# Patient Record
Sex: FEMALE | Race: WHITE | Employment: FULL TIME | URBAN - METROPOLITAN AREA
[De-identification: names, ages, dates, MRNs, and addresses within clinical notes are randomized per-mention and may not be internally consistent; named-entity substitution may affect disease eponyms.]

---

## 2017-01-23 DIAGNOSIS — J45.40 MODERATE PERSISTENT ASTHMA, UNCOMPLICATED: ICD-10-CM

## 2017-01-23 RX ORDER — MONTELUKAST SODIUM 10 MG/1
10 TABLET ORAL DAILY
Qty: 90 TAB | Refills: 2 | Status: SHIPPED | OUTPATIENT
Start: 2017-01-23 | End: 2019-03-20 | Stop reason: SDUPTHER

## 2017-01-23 NOTE — TELEPHONE ENCOUNTER
From: Nadene Ganser  To: Boaz Gomez MD  Sent: 1/23/2017 10:27 AM EST  Subject: Medication Renewal Request    Original authorizing provider: Grant Racer, MD Nadene Ganser would like a refill of the following medications:  montelukast (SINGULAIR) 10 mg tablet Boaz Gomez MD]    Preferred pharmacy: [facility] [address], [address]:

## 2017-03-28 ENCOUNTER — OFFICE VISIT (OUTPATIENT)
Dept: FAMILY MEDICINE CLINIC | Age: 22
End: 2017-03-28

## 2017-03-28 VITALS
RESPIRATION RATE: 18 BRPM | WEIGHT: 137.8 LBS | DIASTOLIC BLOOD PRESSURE: 78 MMHG | TEMPERATURE: 97.9 F | HEIGHT: 62 IN | SYSTOLIC BLOOD PRESSURE: 124 MMHG | HEART RATE: 98 BPM | OXYGEN SATURATION: 99 % | BODY MASS INDEX: 25.36 KG/M2

## 2017-03-28 DIAGNOSIS — R40.0 DAYTIME SOMNOLENCE: Primary | ICD-10-CM

## 2017-03-28 RX ORDER — CETIRIZINE HCL 10 MG
TABLET ORAL
COMMUNITY
End: 2019-04-02 | Stop reason: ALTCHOICE

## 2017-03-28 NOTE — MR AVS SNAPSHOT
Visit Information Date & Time Provider Department Dept. Phone Encounter #  
 3/28/2017  3:00 PM Marian Arcadia, [employer] [phone] [id number] Upcoming Health Maintenance Date Due  
 HPV AGE 9Y-34Y (1 of 3 - Female 3 Dose Series) 9/15/2005 Pneumococcal 19-64 Medium Risk (1 of 1 - PPSV23) 9/15/2013 PAP AKA CERVICAL CYTOLOGY 9/15/2015 DTaP/Tdap/Td series (2 - Td) 6/17/2026 Allergies as of 3/28/2017  Review Complete On: 3/28/2017 By: Latrice Musa LPN Severity Noted Reaction Type Reactions Latex High 11/02/2015    Anaphylaxis, Rash Banana High 08/11/2014    Anaphylaxis Shellfish Derived High 08/11/2014    Anaphylaxis Current Immunizations  Never Reviewed Name Date Influenza Vaccine 10/4/2014 Tdap 6/17/2016 Not reviewed this visit You Were Diagnosed With   
  
 Codes Comments Daytime somnolence    -  Primary ICD-10-CM: R40.0 ICD-9-CM: 780.54 Vitals BP Pulse Temp Resp Height(growth percentile) Weight(growth percentile) 124/78 98 97.9 °F (36.6 °C) 18 5' 2\" (1.575 m) 137 lb 12.8 oz (62.5 kg) LMP SpO2 BMI OB Status Smoking Status 03/12/2017 99% 25.2 kg/m2 Having regular periods Never Smoker Vitals History BMI and BSA Data Body Mass Index Body Surface Area  
 25.2 kg/m 2 1.65 m 2 Preferred Pharmacy Pharmacy Name Phone [facility] - [address], [address] [phone] Your Updated Medication List  
  
   
This list is accurate as of: 3/28/17  3:14 PM.  Always use your most recent med list.  
  
  
  
  
 * albuterol sulfate 2.5 mg/0.5 mL Nebu nebulizer solution Commonly known as:  PROVENTIL;VENTOLIN  
0.5 mL by Nebulization route every four (4) hours as needed for Wheezing. * albuterol 90 mcg/actuation inhaler Commonly known as:  PROVENTIL HFA, VENTOLIN HFA, PROAIR HFA  
 Take 2 Puffs by inhalation every four (4) hours as needed for Wheezing or Shortness of Breath. cetirizine 10 mg tablet Commonly known as:  ZYRTEC Take  by mouth. DULERA 200-5 mcg/actuation HFA inhaler Generic drug:  mometasone-formoterol INHALE 2 PUFFS BY MOUTH 2 TIMES A DAY  
  
 EPINEPHrine 0.15 mg/0.3 mL injection Commonly known as:  EPIPEN JR  
0.15 mg by IntraMUSCular route once as needed. LOESTRIN FE 1/20 (28-DAY) 1 mg-20 mcg (21)/75 mg (7) Tab Generic drug:  norethindrone-ethinyl estradiol Take  by mouth.  
  
 montelukast 10 mg tablet Commonly known as:  SINGULAIR Take 1 Tab by mouth daily. Nebulizer & Compressor machine 1 Each by Does Not Apply route every four (4) hours as needed. Nebulizer Accessories Kit Hosing/tubing * Notice: This list has 2 medication(s) that are the same as other medications prescribed for you. Read the directions carefully, and ask your doctor or other care provider to review them with you. We Performed the Following SLEEP MEDICINE REFERRAL [LRK241 Custom] Comments:  
 Please evaluate patient for daytime somnolence. Gaye Lacey Referral Information Referral ID Referred By Referred To  
  
 [id number] Anita Edouard V Not Available Visits Status Start Date End Date 1 New Request 3/28/17 3/28/18 If your referral has a status of pending review or denied, additional information will be sent to support the outcome of this decision. Patient Instructions Sleep Studies: About This Test 
What is it? Sleep studies are tests that watch what happens to your body during sleep. These studies usually are done in a sleep lab. Sleep labs are often located in hospitals. But sleep studies also can be done with portable equipment that you use at home. Why is this test done? Sleep studies are done to find sleep problems, including: · Sleep apnea or excessive snoring. · Narcolepsy. · Nocturnal seizures. · REM behavior disorder (RBD). · Repeated muscle twitching of the feet, arms, or legs while you sleep. How can you prepare for the test? 
· You may be asked to keep a sleep diary for 1 to 2 weeks before your sleep study. · Don't take any naps for 2 to 3 days before your test. 
· You may be asked to avoid food or drinks with caffeine for a day or two before your test. 
· Take a shower or bath before your test, but don't use sprays, oils, or gels on your hair. Don't wear makeup, fingernail polish, or fake nails. · Pack and take along a small overnight bag with personal items, such as a toothbrush, a comb, favorite pillows or blankets, and a book. You can wear your own nightclothes. What happens during the test? 
· In the sleep lab, you will be in a private room, much like a hotel room. · Small pads or patches called electrodes will be placed on your head and body with a small amount of glue and tape. These will record things like brain activity, eye movement, oxygen levels, and snoring. · Soft elastic belts will be placed around your chest and belly to measure your breathing. · Your blood oxygen levels will be checked by a small clip (oximeter) placed either on the tip of your index finger or on your earlobe. · If you have sleep apnea, you may wear a mask that is connected to a continuous positive airway pressure (CPAP) machine. · Depending on the type of test, you will be allowed to sleep through the night or you will be awakened periodically and asked to stay awake for a while. What else should you know about the test? 
· It may feel odd to be hooked to the sleep study equipment. The sleep lab technician understands that your sleep may not be the same as it is at home because of the equipment. Try to relax and make yourself as comfortable as possible. · After your sleep problem has been identified, you may need a second study if your doctor orders treatment such as CPAP. · Portable sleep study equipment is available for a person to do sleep studies at home. This may be a choice for people who have problems sleeping in a sleep lab. But home sleep studies may not give the same results as a sleep lab. How long does the test take? · You will stay in the sleep lab overnight. For some tests, you will also stay part of the next day. What happens after the test? 
· You will be able to go home right away. · You can go back to your usual activities right away. Follow-up care is a key part of your treatment and safety. Be sure to make and go to all appointments, and call your doctor if you are having problems. It's also a good idea to keep a list of the medicines you take. Ask your doctor when you can expect to have your test results. Where can you learn more? Go to http://ivan-grant.info/. Enter Z481 in the search box to learn more about \"Sleep Studies: About This Test.\" Current as of: August 8, 2016 Content Version: 11.1 © 4281-7692 [employer] [email or web address]. Care instructions adapted under license by [employer] (which disclaims liability or warranty for this information). If you have questions about a medical condition or this instruction, always ask your healthcare professional. [address] any warranty or liability for your use of this information. Introducing [address]! Dear Henry Salmeron: Thank you for requesting a Face.com account. Our records indicate that you already have an active Face.com account. You can access your account anytime at [email or web address] Did you know that you can access your hospital and ER discharge instructions at any time in Face.com? You can also review all of your test results from your hospital stay or ER visit. Additional Information If you have questions, please visit the Frequently Asked Questions section of the [employer] website at [email or web address] Remember, [employer] is NOT to be used for urgent needs. For medical emergencies, dial 911. Now available from your iPhone and Android! Please provide this summary of care documentation to your next provider. Your primary care clinician is listed as Mechelle Adams. If you have any questions after today's visit, please call [phone].

## 2017-03-28 NOTE — PATIENT INSTRUCTIONS
Sleep Studies: About This Test  What is it? Sleep studies are tests that watch what happens to your body during sleep. These studies usually are done in a sleep lab. Sleep labs are often located in hospitals. But sleep studies also can be done with portable equipment that you use at home. Why is this test done? Sleep studies are done to find sleep problems, including:  · Sleep apnea or excessive snoring. · Narcolepsy. · Nocturnal seizures. · REM behavior disorder (RBD). · Repeated muscle twitching of the feet, arms, or legs while you sleep. How can you prepare for the test?  · You may be asked to keep a sleep diary for 1 to 2 weeks before your sleep study. · Don't take any naps for 2 to 3 days before your test.  · You may be asked to avoid food or drinks with caffeine for a day or two before your test.  · Take a shower or bath before your test, but don't use sprays, oils, or gels on your hair. Don't wear makeup, fingernail polish, or fake nails. · Pack and take along a small overnight bag with personal items, such as a toothbrush, a comb, favorite pillows or blankets, and a book. You can wear your own nightclothes. What happens during the test?  · In the sleep lab, you will be in a private room, much like a hotel room. · Small pads or patches called electrodes will be placed on your head and body with a small amount of glue and tape. These will record things like brain activity, eye movement, oxygen levels, and snoring. · Soft elastic belts will be placed around your chest and belly to measure your breathing. · Your blood oxygen levels will be checked by a small clip (oximeter) placed either on the tip of your index finger or on your earlobe. · If you have sleep apnea, you may wear a mask that is connected to a continuous positive airway pressure (CPAP) machine.   · Depending on the type of test, you will be allowed to sleep through the night or you will be awakened periodically and asked to stay awake for a while. What else should you know about the test?  · It may feel odd to be hooked to the sleep study equipment. The sleep lab technician understands that your sleep may not be the same as it is at home because of the equipment. Try to relax and make yourself as comfortable as possible. · After your sleep problem has been identified, you may need a second study if your doctor orders treatment such as CPAP. · Portable sleep study equipment is available for a person to do sleep studies at home. This may be a choice for people who have problems sleeping in a sleep lab. But home sleep studies may not give the same results as a sleep lab. How long does the test take? · You will stay in the sleep lab overnight. For some tests, you will also stay part of the next day. What happens after the test?  · You will be able to go home right away. · You can go back to your usual activities right away. Follow-up care is a key part of your treatment and safety. Be sure to make and go to all appointments, and call your doctor if you are having problems. It's also a good idea to keep a list of the medicines you take. Ask your doctor when you can expect to have your test results. Where can you learn more? Go to http://ivan-grant.info/. Enter O319 in the search box to learn more about \"Sleep Studies: About This Test.\"  Current as of: August 8, 2016  Content Version: 11.1  © 8460-0472 Healthwise, Incorporated. Care instructions adapted under license by [employer] (which disclaims liability or warranty for this information). If you have questions about a medical condition or this instruction, always ask your healthcare professional. [address] any warranty or liability for your use of this information.

## 2017-03-28 NOTE — PROGRESS NOTES
Assessment/Plan:    1. Daytime somnolence  -hypersomnia vs sleep apnea. Sleep study.  - SLEEP MEDICINE REFERRAL    The plan was discussed with the patient. The patient verbalized understanding and is in agreement with the plan. All medication potential side effects were discussed with the patient. Health Maintenance:   Health Maintenance   Topic Date Due    HPV AGE 9Y-34Y (1 of 3 - Female 3 Dose Series) 09/15/2005    Pneumococcal 19-64 Medium Risk (1 of 1 - PPSV23) 09/15/2013    PAP AKA CERVICAL CYTOLOGY  09/15/2015    DTaP/Tdap/Td series (2 - Td) 06/17/2026    INFLUENZA AGE 9 TO ADULT  Completed       Leonel Lamas is a 24 y.o. female and presents with Fatigue     Subjective:  States she has significant fatigue and daytime somnolence. States she's actually fallen asleep while driving. Gets 7-8 hours sleep/night. This has been ongoing x several months. Doesn't wake up feeling refreshed. No nocturia. No morning HA. +snoring. ROS:  Constitutional: No recent weight change. No weakness/fatigue. No f/c. Cardiovascular: No CP/palpitations. No PIZANO/orthopnea/PND. Respiratory: No cough/sputum, dyspnea, wheezing. Gastointestinal: No dysphagia, reflux. No n/v. No constipation/diarrhea. No melena/rectal bleeding. Neurological: No seizures/numbness/weakness. No paresthesias. The problem list was updated as a part of today's visit. Patient Active Problem List   Diagnosis Code    Moderate persistent asthma J45.40    Allergic rhinitis J30.9       The PSH, FH were reviewed. SH:  Social History   Substance Use Topics    Smoking status: Never Smoker    Smokeless tobacco: Never Used    Alcohol use No       Medications/Allergies:  Current Outpatient Prescriptions on File Prior to Visit   Medication Sig Dispense Refill    DULERA 200-5 mcg/actuation HFA inhaler INHALE 2 PUFFS BY MOUTH 2 TIMES A DAY 13 Inhaler 6    montelukast (SINGULAIR) 10 mg tablet Take 1 Tab by mouth daily. 90 Tab 2    Nebulizer & Compressor machine 1 Each by Does Not Apply route every four (4) hours as needed. 1 Each 0    Nebulizer Accessories kit Hosing/tubing 1 Kit 0    albuterol (PROVENTIL HFA, VENTOLIN HFA, PROAIR HFA) 90 mcg/actuation inhaler Take 2 Puffs by inhalation every four (4) hours as needed for Wheezing or Shortness of Breath. 1 Inhaler 6    albuterol sulfate (PROVENTIL;VENTOLIN) 2.5 mg/0.5 mL nebu nebulizer solution 0.5 mL by Nebulization route every four (4) hours as needed for Wheezing. 20 mL 6    EPINEPHrine (EPIPEN JR) 0.15 mg/0.3 mL (1:2,000) injection 0.15 mg by IntraMUSCular route once as needed.  norethindrone-ethinyl estradiol (LOESTRIN FE 1/20, 28,) 1-20 mg-mcg per tablet Take  by mouth. No current facility-administered medications on file prior to visit. Allergies   Allergen Reactions    Latex Anaphylaxis and Rash    Banana Anaphylaxis    Shellfish Derived Anaphylaxis       Objective:  Visit Vitals    /78    Pulse 98    Temp 97.9 °F (36.6 °C)    Resp 18    Ht 5' 2\" (1.575 m)    Wt 137 lb 12.8 oz (62.5 kg)    LMP 03/12/2017    SpO2 99%    BMI 25.2 kg/m2      Constitutional: Well developed, nourished, no distress, alert, obese habitus   CV: S1, S2.  RRR. No murmurs/rubs. No thrills palpated. No carotid bruits. Intact distal pulses. No edema. Pulm: No abnormalities on inspection. Clear to auscultation bilaterally. No wheezing/rhonchi. Normal effort. Neuro: A/O x 3. No focal motor or sensory deficits. Speech normal.   Psych: Appropriate affect, judgement and insight. Short-term memory intact.

## 2017-03-28 NOTE — PROGRESS NOTES
Leonel Lamas is a 24 y.o. female here today with c/o fatigue presented about 1 month ago. 1. Have you been to the ER, urgent care clinic since your last visit? Hospitalized since your last visit? No    2. Have you seen or consulted any other health care providers outside of the [address] [place] [address] since your last visit? Include any pap smears or colon screening.  No

## 2017-06-20 DIAGNOSIS — J45.40 MODERATE PERSISTENT ASTHMA, UNCOMPLICATED: ICD-10-CM

## 2017-06-20 DIAGNOSIS — J45.40 MODERATE PERSISTENT ASTHMA WITHOUT COMPLICATION: ICD-10-CM

## 2017-06-20 RX ORDER — NEBULIZER AND COMPRESSOR
1 EACH MISCELLANEOUS
Qty: 1 EACH | Refills: 0 | Status: SHIPPED | OUTPATIENT
Start: 2017-06-20 | End: 2019-04-02 | Stop reason: ALTCHOICE

## 2017-06-20 RX ORDER — ALBUTEROL SULFATE 2.5 MG/.5ML
2.5 SOLUTION RESPIRATORY (INHALATION)
Qty: 20 ML | Refills: 6 | Status: SHIPPED | OUTPATIENT
Start: 2017-06-20 | End: 2018-11-06 | Stop reason: SDUPTHER

## 2017-07-11 DIAGNOSIS — J45.40 MODERATE PERSISTENT ASTHMA, UNCOMPLICATED: ICD-10-CM

## 2017-07-12 RX ORDER — ALBUTEROL SULFATE 90 UG/1
2 AEROSOL, METERED RESPIRATORY (INHALATION)
Qty: 1 INHALER | Refills: 6 | Status: SHIPPED | OUTPATIENT
Start: 2017-07-12 | End: 2018-07-17 | Stop reason: SDUPTHER

## 2017-07-12 NOTE — TELEPHONE ENCOUNTER
From: Stephan Prabhakar  To: Blanca Connors MD  Sent: 7/11/2017 8:43 PM EDT  Subject: Medication Renewal Request    Original authorizing provider: Blanca Connors MD    [facility].  Rachel Marroquin would like a refill of the following medications:  albuterol (PROVENTIL HFA, VENTOLIN HFA, PROAIR HFA) 90 mcg/actuation inhaler Blanca Connors MD]    Preferred pharmacy: [facility] [facility]:

## 2018-07-17 ENCOUNTER — OFFICE VISIT (OUTPATIENT)
Dept: FAMILY MEDICINE CLINIC | Age: 23
End: 2018-07-17

## 2018-07-17 VITALS
HEART RATE: 95 BPM | WEIGHT: 148 LBS | OXYGEN SATURATION: 95 % | BODY MASS INDEX: 27.23 KG/M2 | RESPIRATION RATE: 20 BRPM | SYSTOLIC BLOOD PRESSURE: 110 MMHG | HEIGHT: 62 IN | TEMPERATURE: 98.2 F | DIASTOLIC BLOOD PRESSURE: 80 MMHG

## 2018-07-17 DIAGNOSIS — J45.40 MODERATE PERSISTENT ASTHMA, UNCOMPLICATED: ICD-10-CM

## 2018-07-17 DIAGNOSIS — R06.83 SNORING: ICD-10-CM

## 2018-07-17 DIAGNOSIS — Z23 ENCOUNTER FOR IMMUNIZATION: ICD-10-CM

## 2018-07-17 DIAGNOSIS — R40.0 DAYTIME SOMNOLENCE: Primary | ICD-10-CM

## 2018-07-17 RX ORDER — FLUTICASONE FUROATE AND VILANTEROL 100; 25 UG/1; UG/1
1 POWDER RESPIRATORY (INHALATION) DAILY
COMMUNITY
End: 2019-03-20 | Stop reason: SDUPTHER

## 2018-07-17 RX ORDER — ALBUTEROL SULFATE 90 UG/1
2 AEROSOL, METERED RESPIRATORY (INHALATION)
Qty: 3 INHALER | Refills: 3 | Status: SHIPPED | OUTPATIENT
Start: 2018-07-17 | End: 2019-01-25 | Stop reason: SDUPTHER

## 2018-07-17 NOTE — PROGRESS NOTES
Tom España is a 20 y.o. female (: 1995) presenting to address:    Chief Complaint   Patient presents with    Allergic Rhinitis    Asthma       Vitals:    18 0815   BP: 110/80   Pulse: 95   Resp: 20   Temp: 98.2 °F (36.8 °C)   TempSrc: Oral   SpO2: 95%   Weight: 148 lb (67.1 kg)   Height: 5' 2\" (1.575 m)   PainSc:   0 - No pain   LMP: 2018       Learning Assessment:     Learning Assessment 2014   PRIMARY LEARNER Patient   HIGHEST LEVEL OF EDUCATION - PRIMARY LEARNER  SOME COLLEGE   BARRIERS PRIMARY LEARNER NONE   CO-LEARNER CAREGIVER No   PRIMARY LANGUAGE ENGLISH   LEARNER PREFERENCE PRIMARY DEMONSTRATION   ANSWERED BY patient   RELATIONSHIP SELF     Depression Screening:     PHQ over the last two weeks 2018   Little interest or pleasure in doing things Not at all   Feeling down, depressed or hopeless Not at all   Total Score PHQ 2 0     Fall Risk Assessment:     Fall Risk Assessment, last 12 mths 2018   Able to walk? Yes   Fall in past 12 months? No     Abuse Screening:     Abuse Screening Questionnaire 2018   Do you ever feel afraid of your partner? N   Are you in a relationship with someone who physically or mentally threatens you? N   Is it safe for you to go home? Y     Coordination of Care Questionaire:   1. Have you been to the ER, urgent care clinic since your last visit? Hospitalized since your last visit? NO    2. Have you seen or consulted any other health care providers outside of the [address] [place] [address] since your last visit? Include any pap smears or colon screening. NO    Advanced Directive:   1. Do you have an Advanced Directive? NO    2. Would you like information on Advanced Directives?  NO

## 2018-07-17 NOTE — PROGRESS NOTES
Assessment/Plan:    1. Moderate persistent asthma, uncomplicated  -well controlled. - albuterol (PROVENTIL HFA, VENTOLIN HFA, PROAIR HFA) 90 mcg/actuation inhaler; Take 2 Puffs by inhalation every four (4) hours as needed for Wheezing or Shortness of Breath. Dispense: 3 Inhaler; Refill: 3    2. Daytime somnolence, snoring - couldn't afford sleep study previously. - SLEEP MEDICINE REFERRAL    3. Encounter for immunization  - [place] IMMUNIZ ADMIN,1 SINGLE/COMB VAC/TOXOID  - Pneumococcal polysaccharide vaccine, 23-valent, adult or immunosuppressed pt dose    The plan was discussed with the patient. The patient verbalized understanding and is in agreement with the plan. All medication potential side effects were discussed with the patient. Health Maintenance:   Health Maintenance   Topic Date Due    Pneumococcal 19-64 Medium Risk (1 of 1 - PPSV23) 09/15/2013    HPV Age 9Y-34Y (1 of 3 - Female 3 Dose Series) 01/01/2030 (Originally 9/15/2005)    Influenza Age 5 to Adult  08/01/2018    PAP AKA CERVICAL CYTOLOGY  01/01/2020    DTaP/Tdap/Td series (2 - Td) 06/17/2026       Nigel Mandujano is a 20 y.o. female and presents with Allergic Rhinitis and Asthma     Subjective:  Hasn't been seen in a year and a half. Asthma and allergic rhinitis - on breo. Uses albuterol rarely. On oral antihistamine +singulair. She snores and has daytime somnolence. Many members in her family have KINGSLEY. ROS:  Constitutional: No recent weight change. No weakness/+fatigue. No f/c. Cardiovascular: No CP/palpitations. No PIZANO/orthopnea/PND. Respiratory: No cough/sputum, dyspnea, wheezing. Gastointestinal: No dysphagia, reflux. No n/v. No constipation/diarrhea. No melena/rectal bleeding. Heme: No h/o anemia. No easy bleeding/bruising. Allergy/Immunology: + seasonal rhinitis. Denies frequent colds, sinus/ear infections. Neurological: No seizures/numbness/weakness. No paresthesias.    Psychiatric:  No depression, anxiety. The problem list was updated as a part of today's visit. Patient Active Problem List   Diagnosis Code    Moderate persistent asthma J45.40    Allergic rhinitis J30.9       The PSH, FH were reviewed. SH:  Social History   Substance Use Topics    Smoking status: Never Smoker    Smokeless tobacco: Never Used    Alcohol use No       Medications/Allergies:    Current Outpatient Prescriptions:     fluticasone-vilanterol (BREO ELLIPTA) 100-25 mcg/dose inhaler, Take 1 Puff by inhalation daily. , Disp: , Rfl:     albuterol (PROVENTIL HFA, VENTOLIN HFA, PROAIR HFA) 90 mcg/actuation inhaler, Take 2 Puffs by inhalation every four (4) hours as needed for Wheezing or Shortness of Breath., Disp: 1 Inhaler, Rfl: 6    Nebulizer & Compressor machine, 1 Each by Does Not Apply route every four (4) hours as needed. , Disp: 1 Each, Rfl: 0    Nebulizer Accessories kit, Hosing/tubing, Disp: 1 Kit, Rfl: 0    albuterol sulfate (PROVENTIL;VENTOLIN) 2.5 mg/0.5 mL nebu nebulizer solution, 0.5 mL by Nebulization route every four (4) hours as needed for Wheezing., Disp: 20 mL, Rfl: 6    cetirizine (ZYRTEC) 10 mg tablet, Take  by mouth., Disp: , Rfl:     montelukast (SINGULAIR) 10 mg tablet, Take 1 Tab by mouth daily. , Disp: 90 Tab, Rfl: 2    EPINEPHrine (EPIPEN JR) 0.15 mg/0.3 mL (1:2,000) injection, 0.15 mg by IntraMUSCular route once as needed. , Disp: , Rfl:     norethindrone-ethinyl estradiol (LOESTRIN FE 1/20, 28,) 1-20 mg-mcg per tablet, Take  by mouth., Disp: , Rfl:        Allergies   Allergen Reactions    Latex Anaphylaxis and Rash    Banana Anaphylaxis    Shellfish Derived Anaphylaxis       Objective:  Visit Vitals    /80 (BP 1 Location: Left arm, BP Patient Position: Sitting)    Pulse 95    Temp 98.2 °F (36.8 °C) (Oral)    Resp 20    Ht 5' 2\" (1.575 m)    Wt 148 lb (67.1 kg)    LMP 07/06/2018    SpO2 95%    BMI 27.07 kg/m2      Constitutional: Well developed, nourished, no distress, alert   CV: S1, S2.  RRR. No murmurs/rubs. No thrills palpated. No carotid bruits. Intact distal pulses. No edema. No aortic bruits. Pulm: No abnormalities on inspection. Clear to auscultation bilaterally. No wheezing/rhonchi. Normal effort. GI: Soft, nontender, nondistended. Normal active bowel sounds.

## 2018-07-17 NOTE — PROGRESS NOTES
Immunization/s administered 7/17/2018 by Chuy Nolasco LPN with guardian's consent. Patient tolerated procedure well. No reactions noted. Pneumo 23 left deltoid.

## 2018-07-17 NOTE — MR AVS SNAPSHOT
[address] 220 [facility] [address] [phone] Patient: Billie Angel MRN: [id number] PQI:0/13/2314 Visit Information Date & Time Provider Department Dept. Phone Encounter #  
 7/17/2018  8:15 AM Ashwin JiangSummer [phone] [id number] Upcoming Health Maintenance Date Due Pneumococcal 19-64 Medium Risk (1 of 1 - PPSV23) 9/15/2013 HPV Age 9Y-34Y (1 of 3 - Female 3 Dose Series) 1/1/2030* Influenza Age 5 to Adult 8/1/2018 PAP AKA CERVICAL CYTOLOGY 1/1/2020 DTaP/Tdap/Td series (2 - Td) 6/17/2026 *Topic was postponed. The date shown is not the original due date. Allergies as of 7/17/2018  Review Complete On: 7/17/2018 By: Ashwin Jiang MD  
  
 Severity Noted Reaction Type Reactions Latex High 11/02/2015    Anaphylaxis, Rash Banana High 08/11/2014    Anaphylaxis Shellfish Derived High 08/11/2014    Anaphylaxis Current Immunizations  Never Reviewed Name Date Influenza Vaccine 10/4/2014 Pneumococcal Polysaccharide (PPSV-23)  Incomplete Tdap 6/17/2016 Not reviewed this visit You Were Diagnosed With   
  
 Codes Comments Daytime somnolence    -  Primary ICD-10-CM: R40.0 ICD-9-CM: 780.54 Moderate persistent asthma, uncomplicated     [facility]-08-K: J45.40 ICD-9-CM: 493.90 Snoring     ICD-10-CM: R06.83 
ICD-9-CM: 786.09 Encounter for immunization     ICD-10-CM: Q71 ICD-9-CM: V03.89 Vitals BP Pulse Temp Resp Height(growth percentile) Weight(growth percentile) 110/80 (BP 1 Location: Left arm, BP Patient Position: Sitting) 95 98.2 °F (36.8 °C) (Oral) 20 5' 2\" (1.575 m) 148 lb (67.1 kg) LMP SpO2 BMI OB Status Smoking Status 07/06/2018 95% 27.07 kg/m2 Having regular periods Never Smoker BMI and BSA Data Body Mass Index Body Surface Area  
 27.07 kg/m 2 1.71 m 2 Preferred Pharmacy Pharmacy Name Phone [facility] [phone] [phone] Your Updated Medication List  
  
   
This list is accurate as of 7/17/18  8:30 AM.  Always use your most recent med list.  
  
  
  
  
 * albuterol sulfate 2.5 mg/0.5 mL Nebu nebulizer solution Commonly known as:  PROVENTIL;VENTOLIN  
0.5 mL by Nebulization route every four (4) hours as needed for Wheezing. * albuterol 90 mcg/actuation inhaler Commonly known as:  PROVENTIL HFA, VENTOLIN HFA, PROAIR HFA Take 2 Puffs by inhalation every four (4) hours as needed for Wheezing or Shortness of Breath. BREO ELLIPTA 100-25 mcg/dose inhaler Generic drug:  fluticasone-vilanterol Take 1 Puff by inhalation daily. cetirizine 10 mg tablet Commonly known as:  ZYRTEC Take  by mouth. EPINEPHrine 0.15 mg/0.3 mL injection Commonly known as:  EPIPEN JR  
0.15 mg by IntraMUSCular route once as needed. LOESTRIN FE 1/20 (28-DAY) 1 mg-20 mcg (21)/75 mg (7) Tab Generic drug:  norethindrone-ethinyl estradiol Take  by mouth.  
  
 montelukast 10 mg tablet Commonly known as:  SINGULAIR Take 1 Tab by mouth daily. Nebulizer & Compressor machine 1 Each by Does Not Apply route every four (4) hours as needed. Nebulizer Accessories Kit Hosing/tubing * Notice: This list has 2 medication(s) that are the same as other medications prescribed for you. Read the directions carefully, and ask your doctor or other care provider to review them with you. Prescriptions Sent to Pharmacy Refills  
 albuterol (PROVENTIL HFA, VENTOLIN HFA, PROAIR HFA) 90 mcg/actuation inhaler 3 Sig: Take 2 Puffs by inhalation every four (4) hours as needed for Wheezing or Shortness of Breath. Class: Normal  
 Pharmacy: [address] [place] [address], [address] [place] [address] #: [phone] Route: Inhalation We Performed the Following PNEUMOCOCCAL POLYSACCHARIDE VACCINE, 23-VALENT, ADULT OR IMMUNOSUPPRESSED PT DOSE, [19369 CPT(R)] LA IMMUNIZ ADMIN,1 SINGLE/COMB VAC/TOXOID [id number] CPT(R)] SLEEP MEDICINE REFERRAL [VYA882 Custom] Referral Information Referral ID Referred By Referred To  
  
 [id number] [address] Bayron Kumar, [address] MD   
   [address] [place] [address] Phone: [phone] Fax: [phone] Visits Status Start Date End Date 1 New Request 7/17/18 7/17/19 If your referral has a status of pending review or denied, additional information will be sent to support the outcome of this decision. Introducing [facility]! Dear Winnie Gill: Thank you for requesting a sofatronic account. Our records indicate that you already have an active sofatronic account. You can access your account anytime at [email or web address] Did you know that you can access your hospital and ER discharge instructions at any time in sofatronic? You can also review all of your test results from your hospital stay or ER visit. Additional Information If you have questions, please visit the Frequently Asked Questions section of the sofatronic website at [email or web address] Remember, sofatronic is NOT to be used for urgent needs. For medical emergencies, dial 911. Now available from your iPhone and Android! Please provide this summary of care documentation to your next provider. Your primary care clinician is listed as Mechelle Adams. If you have any questions after today's visit, please call [phone].

## 2018-11-06 DIAGNOSIS — J45.40 MODERATE PERSISTENT ASTHMA, UNCOMPLICATED: ICD-10-CM

## 2018-11-08 RX ORDER — ALBUTEROL SULFATE 2.5 MG/.5ML
2.5 SOLUTION RESPIRATORY (INHALATION)
Qty: 20 ML | Refills: 6 | Status: SHIPPED | OUTPATIENT
Start: 2018-11-08 | End: 2018-11-23 | Stop reason: SDUPTHER

## 2018-11-23 DIAGNOSIS — J45.40 MODERATE PERSISTENT ASTHMA, UNCOMPLICATED: ICD-10-CM

## 2018-11-23 NOTE — TELEPHONE ENCOUNTER
Pt is requesting a diluted version of this medication. She said that she is not able to find the solution that is needed to put in the medication.

## 2018-11-26 NOTE — TELEPHONE ENCOUNTER
Left message for patient to call office from below message I am not sure what it is that she needs a different strength of this medication to put in her machine ?

## 2018-12-06 RX ORDER — ALBUTEROL SULFATE 2.5 MG/.5ML
2.5 SOLUTION RESPIRATORY (INHALATION)
Qty: 20 ML | Refills: 6 | Status: SHIPPED | OUTPATIENT
Start: 2018-12-06 | End: 2019-12-03 | Stop reason: SDUPTHER

## 2019-01-25 DIAGNOSIS — J45.40 MODERATE PERSISTENT ASTHMA, UNCOMPLICATED: ICD-10-CM

## 2019-01-25 RX ORDER — ALBUTEROL SULFATE 90 UG/1
2 AEROSOL, METERED RESPIRATORY (INHALATION)
Qty: 3 INHALER | Refills: 3 | Status: SHIPPED | OUTPATIENT
Start: 2019-01-25 | End: 2020-01-17

## 2019-01-25 NOTE — TELEPHONE ENCOUNTER
Orders Placed This Encounter    albuterol (PROVENTIL HFA, VENTOLIN HFA, PROAIR HFA) 90 mcg/actuation inhaler     Sig: Take 2 Puffs by inhalation every four (4) hours as needed for Wheezing or Shortness of Breath.      Dispense:  3 Inhaler     Refill:  3     On behalf of Dr. Shelbi Rodriguez

## 2019-01-25 NOTE — TELEPHONE ENCOUNTER
Requested Prescriptions     Pending Prescriptions Disp Refills    albuterol (PROVENTIL HFA, VENTOLIN HFA, PROAIR HFA) 90 mcg/actuation inhaler 3 Inhaler 3     Sig: Take 2 Puffs by inhalation every four (4) hours as needed for Wheezing or Shortness of Breath. PT said she is almost out and doesn't think it will last through the weekend. Remaining pills: Almost out  Last appt:07/17/2018  Next appt: N/A    Pharmacy:[facility]      Patient aware of 72 hour time frame.

## 2019-03-07 ENCOUNTER — OFFICE VISIT (OUTPATIENT)
Dept: FAMILY MEDICINE CLINIC | Age: 24
End: 2019-03-07

## 2019-03-07 VITALS
TEMPERATURE: 98 F | SYSTOLIC BLOOD PRESSURE: 122 MMHG | HEART RATE: 103 BPM | HEIGHT: 62 IN | OXYGEN SATURATION: 98 % | WEIGHT: 150.6 LBS | BODY MASS INDEX: 27.71 KG/M2 | RESPIRATION RATE: 18 BRPM | DIASTOLIC BLOOD PRESSURE: 82 MMHG

## 2019-03-07 DIAGNOSIS — R68.89 FLU-LIKE SYMPTOMS: Primary | ICD-10-CM

## 2019-03-07 LAB
FLUAV+FLUBV AG NOSE QL IA.RAPID: NEGATIVE POS/NEG
FLUAV+FLUBV AG NOSE QL IA.RAPID: NEGATIVE POS/NEG
VALID INTERNAL CONTROL?: YES

## 2019-03-07 NOTE — LETTER
3/7/2019 1:48 PM 
 
Ms. Aleksandr Regan [phone] [place] [address] Aleksandr Regan was seen in the office of [employer] on 3/7/2019. Please excuse her from any missed obligations. She is clear to return to work tomorrow (3/8/19) as long as she remains fever free. Sincerely, Abbi Huertas MD

## 2019-03-07 NOTE — PATIENT INSTRUCTIONS
Influenza (Flu): Care Instructions  Your Care Instructions    Influenza (flu) is an infection in the lungs and breathing passages. It is caused by the influenza virus. There are different strains, or types, of the flu virus from year to year. Unlike the common cold, the flu comes on suddenly and the symptoms, such as a cough, congestion, fever, chills, fatigue, aches, and pains, are more severe. These symptoms may last up to 10 days. Although the flu can make you feel very sick, it usually doesn't cause serious health problems. Home treatment is usually all you need for flu symptoms. But your doctor may prescribe antiviral medicine to prevent other health problems, such as pneumonia, from developing. Older people and those who have a long-term health condition, such as lung disease, are most at risk for having pneumonia or other health problems. Follow-up care is a key part of your treatment and safety. Be sure to make and go to all appointments, and call your doctor if you are having problems. It's also a good idea to know your test results and keep a list of the medicines you take. How can you care for yourself at home? · Get plenty of rest.  · Drink plenty of fluids, enough so that your urine is light yellow or clear like water. If you have kidney, heart, or liver disease and have to limit fluids, talk with your doctor before you increase the amount of fluids you drink. · Take an over-the-counter pain medicine if needed, such as acetaminophen (Tylenol), ibuprofen (Advil, Motrin), or naproxen (Aleve), to relieve fever, headache, and muscle aches. Read and follow all instructions on the label. No one younger than 20 should take aspirin. It has been linked to Reye syndrome, a serious illness. · Do not smoke. Smoking can make the flu worse. If you need help quitting, talk to your doctor about stop-smoking programs and medicines. These can increase your chances of quitting for good.   · Breathe moist air from a hot shower or from a sink filled with hot water to help clear a stuffy nose. · Before you use cough and cold medicines, check the label. These medicines may not be safe for young children or for people with certain health problems. · If the skin around your nose and lips becomes sore, put some petroleum jelly on the area. · To ease coughing:  ? Drink fluids to soothe a scratchy throat. ? Suck on cough drops or plain hard candy. ? Take an over-the-counter cough medicine that contains dextromethorphan to help you get some sleep. Read and follow all instructions on the label. ? Raise your head at night with an extra pillow. This may help you rest if coughing keeps you awake. · Take any prescribed medicine exactly as directed. Call your doctor if you think you are having a problem with your medicine. To avoid spreading the flu  · Wash your hands regularly, and keep your hands away from your face. · Stay home from school, work, and other public places until you are feeling better and your fever has been gone for at least 24 hours. The fever needs to have gone away on its own without the help of medicine. · Ask people living with you to talk to their doctors about preventing the flu. They may get antiviral medicine to keep from getting the flu from you. · To prevent the flu in the future, get a flu vaccine every fall. Encourage people living with you to get the vaccine. · Cover your mouth when you cough or sneeze. When should you call for help? Call 911 anytime you think you may need emergency care.  For example, call if:    · You have severe trouble breathing.    Call your doctor now or seek immediate medical care if:    · You have new or worse trouble breathing.     · You seem to be getting much sicker.     · You feel very sleepy or confused.     · You have a new or higher fever.     · You get a new rash.    Watch closely for changes in your health, and be sure to contact your doctor if:    · You begin to get better and then get worse.     · You are not getting better after 1 week. Where can you learn more? Go to http://ivan-grant.info/. Enter Q918 in the search box to learn more about \"Influenza (Flu): Care Instructions. \"  Current as of: September 5, 2018  Content Version: 11.9  © 4097-0959 [employer]. Care instructions adapted under license by [employer] (which disclaims liability or warranty for this information). If you have questions about a medical condition or this instruction, always ask your healthcare professional. [place] [address] any warranty or liability for your use of this information.

## 2019-03-07 NOTE — PROGRESS NOTES
Sanjay Ortiz is a 24 y.o. female (: 1995) for sick visit:     Chief Complaint   Patient presents with    Generalized Body Aches    Cough       Vitals:    19 1322   BP: 122/82   Pulse: (!) 103   Resp: 18   Temp: 98 °F (36.7 °C)   TempSrc: Oral   SpO2: 98%   Weight: 150 lb 9.6 oz (68.3 kg)   Height: 5' 2\" (1.575 m)   PainSc:   4   PainLoc: Generalized   LMP: 02/15/2019         Coordination of Care Questionaire:   1. Have you been to the ER, urgent care clinic since your last visit? Hospitalized since your last visit? no    2. Have you seen or consulted any other health care providers outside of the [address] [place] [address] since your last visit? Include any pap smears or colon screening.   no    Health Maintenance      Health Maintenance Due   Topic Date Due    Influenza Age 5 to Adult  2018

## 2019-03-07 NOTE — PROGRESS NOTES
PRE-VISIT PLANNING:     PATIENT NAME:  Yuniel Casey   :  1995   PCP:  Stephanie Wade MD     Future Appointments   Date Time Provider Danish Murillo   3/7/2019  1:30 PM Anabela Maya MD [address] is a patient of Stephanie Wade MD who is scheduled for an office visit with Bakari Kemp MD on 2019 for eval of flu like symptoms. Encounter opened prior to visit to complete pre-visit planning. Pending issues:  -- No flu shot administration recorded for this season --> had flu shot in October    There are no preventive care reminders to display for this patient. Rooming Nurse:     -- Rapid flu         Internal Medicine  Acute Care Visit  [address] [facility] [place]  [address] [facility] [place], [address] [facility]  Phone [phone]; Fax [phone]    Date of Service:  2019  Patient's Name & :   Yuniel Casey -    Patient's PCP:  Stephanie Wade MD     Subjective/Discussion        Chief Complaint   Patient presents with    Generalized Body Aches    Cough       Yuniel Casey is a 24 y.o. female who presents complaining of sudden onset flu-like symptoms starting <24 hours ago, initially runny nose. Has hx of asthma, feels controlled well by albuterol txs. Had flu shot through [facility] in Oct.      ROS positive for:  fevers, chills, malaise, body aches, runny nose, sore throat and cough. 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    Viral upper respiratory illness, symptoms essentially identical to flu strains currently rampant in this area  Flu testing for A&B negative in office today  Symptomatic therapy suggested: push fluids, rest, use acetaminophen, cough suppressant of choice prn and return office visit prn if symptoms persist or worsen. Expected time course for symptom resolution reviewed with patient (2-3 days). Call or return to clinic prn if these symptoms worsen or fail to improve as anticipated. Buckley Klinefelter, MD   03/07/2019 1:29 PM     Objective:     /82 (BP 1 Location: Right arm, BP Patient Position: Sitting)   Pulse (!) 103   Temp 98 °F (36.7 °C) (Oral)   Resp 18   Ht 5' 2\" (1.575 m)   Wt 150 lb 9.6 oz (68.3 kg)   LMP 02/15/2019   SpO2 98%   BMI 27.55 kg/m²     Appears moderately ill but not toxic; temperature as noted in vitals. Ears normal. Throat and pharynx normal.  Neck supple. No adenopathy in the neck. Sinuses non tender. The chest is clear. Vitals as noted above. Appearance: ill-appearing and mild to moderately ill, but nontoxic, in no acute distress. EYES - no conjunctival injection, corneas clear, PERR, EOMs intact  ENT- neck without palpable nodes and sinuses nontender  Chest - regular rate, regular rhythm, no murmurs, rubs, or gallops, no reproducible chest wall tenderness, normal respiratory effort, clear to auscultation with symmetric air entry. Additional History   Patient's Past Med Hx, Surg Hx, Soc Hx, Family Hx reviewed. Current Outpatient Medications   Medication Sig    albuterol (PROVENTIL HFA, VENTOLIN HFA, PROAIR HFA) 90 mcg/actuation inhaler Take 2 Puffs by inhalation every four (4) hours as needed for Wheezing or Shortness of Breath.  albuterol sulfate (PROVENTIL;VENTOLIN) 2.5 mg/0.5 mL nebu nebulizer solution 0.5 mL by Nebulization route every four (4) hours as needed for Wheezing.  fluticasone-vilanterol (BREO ELLIPTA) 100-25 mcg/dose inhaler Take 1 Puff by inhalation daily.  Nebulizer & Compressor machine 1 Each by Does Not Apply route every four (4) hours as needed.     Nebulizer Accessories kit Hosing/tubing    cetirizine (ZYRTEC) 10 mg tablet Take  by mouth.  montelukast (SINGULAIR) 10 mg tablet Take 1 Tab by mouth daily.  EPINEPHrine (EPIPEN JR) 0.15 mg/0.3 mL (1:2,000) injection 0.15 mg by IntraMUSCular route once as needed.  norethindrone-ethinyl estradiol (LOESTRIN FE 1/20, 28,) 1-20 mg-mcg per tablet Take  by mouth. No current facility-administered medications for this visit.       Allergies   Allergen Reactions    Latex Anaphylaxis and Rash    Banana Anaphylaxis    Shellfish Derived Anaphylaxis       Encounter Diagnoses:     Encounter Diagnoses     ICD-10-CM ICD-9-CM   1. Flu-like symptoms R68.89 780.99

## 2019-03-20 RX ORDER — ALBUTEROL SULFATE 0.83 MG/ML
2.5 SOLUTION RESPIRATORY (INHALATION)
Qty: 75 EACH | Refills: 1 | Status: SHIPPED | OUTPATIENT
Start: 2019-03-20 | End: 2019-10-12 | Stop reason: SDUPTHER

## 2019-03-20 RX ORDER — ALBUTEROL SULFATE 0.83 MG/ML
1 SOLUTION RESPIRATORY (INHALATION)
COMMUNITY
End: 2019-03-20 | Stop reason: SDUPTHER

## 2019-03-20 RX ORDER — FLUTICASONE FUROATE AND VILANTEROL 100; 25 UG/1; UG/1
1 POWDER RESPIRATORY (INHALATION) DAILY
Qty: 3 INHALER | Refills: 0 | Status: SHIPPED | OUTPATIENT
Start: 2019-03-20 | End: 2019-04-02 | Stop reason: ALTCHOICE

## 2019-03-20 NOTE — TELEPHONE ENCOUNTER
I called pt back. She is on the [facility] inhaler and singulair as well. Her breathing issue always flares with change of season but she feels that she has been going downhill since the fall change over to winter. In the last few weeks she has been out of control. Her [facility] came from a [facility] physician she saw temporarily due to an insurance change between 2017 and 2018. She had refills left from that dr but they won't refill it anymore. Now she needs a refill from us. She has been taking Breo daily. Pt is only seeing Dr. Shelbi Rodriguez.

## 2019-03-20 NOTE — TELEPHONE ENCOUNTER
Patient called today asking for refill for her nebulizer. Pt has been struggling with her asthma symptoms. She is using her neb very often. She saw a pulm dr (she doesn't remember who) that Dr. Kimberley Zheng referred her to. They diagnosed her with asthma and never had her come back for any follow up. She is not using a controller. She is leaving work very frequently for breathing issues. Her employer will not let her keep her neb at work (12 hour days). Her work suggests she puts in [id number] [employer] to cover her for these times. She isn't eligible until 4/2/19 for FMLA. Pt scheduled an appt on that day. She declined a f/u or sick visit before then due to her work schedule. She would like to continue on her frequent nebs until she comes in 4/2/19. She asks specifically for this 0.083% solution.

## 2019-04-02 ENCOUNTER — OFFICE VISIT (OUTPATIENT)
Dept: FAMILY MEDICINE CLINIC | Age: 24
End: 2019-04-02

## 2019-04-02 VITALS
BODY MASS INDEX: 27.57 KG/M2 | DIASTOLIC BLOOD PRESSURE: 70 MMHG | HEIGHT: 62 IN | OXYGEN SATURATION: 98 % | TEMPERATURE: 98.2 F | HEART RATE: 106 BPM | SYSTOLIC BLOOD PRESSURE: 100 MMHG | RESPIRATION RATE: 18 BRPM | WEIGHT: 149.8 LBS

## 2019-04-02 DIAGNOSIS — J30.9 ALLERGIC RHINITIS, UNSPECIFIED SEASONALITY, UNSPECIFIED TRIGGER: ICD-10-CM

## 2019-04-02 DIAGNOSIS — J45.41 MODERATE PERSISTENT ASTHMA WITH ACUTE EXACERBATION: Primary | ICD-10-CM

## 2019-04-02 RX ORDER — FLUTICASONE FUROATE AND VILANTEROL 200; 25 UG/1; UG/1
1 POWDER RESPIRATORY (INHALATION) DAILY
Qty: 90 EACH | Refills: 0 | Status: SHIPPED | OUTPATIENT
Start: 2019-04-02 | End: 2020-04-14

## 2019-04-02 RX ORDER — MONTELUKAST SODIUM 10 MG/1
10 TABLET ORAL DAILY
Qty: 90 TAB | Refills: 3 | Status: SHIPPED | OUTPATIENT
Start: 2019-04-02 | End: 2020-04-14

## 2019-04-02 RX ORDER — LORATADINE 10 MG/1
10 TABLET ORAL
COMMUNITY

## 2019-04-02 NOTE — PROGRESS NOTES
Assessment/Plan: 1. Moderate persistent asthma with acute exacerbation 
-incr dose breo. If not controlled in 3 weeks, make f/u appt and can add inhaled anticholinergic. FMLA paperwork completed. ADA request to be allowed to use nebulizer in office or for unscheduled breaks to use nebulizer in car 
- fluticasone furoate-vilanterol (BREO ELLIPTA) 200-25 mcg/dose inhaler; Take 1 Puff by inhalation daily. Dispense: 90 Each; Refill: 0 
- montelukast (SINGULAIR) 10 mg tablet; Take 1 Tab by mouth daily. Dispense: 90 Tab; Refill: 3 The plan was discussed with the patient. The patient verbalized understanding and is in agreement with the plan. All medication potential side effects were discussed with the patient. Health Maintenance:  
Health Maintenance Topic Date Due  Influenza Age 5 to Adult  08/16/2019 (Originally 8/1/2019)  HPV Age 9Y-34Y (1 - Female 3-dose series) 01/01/2030 (Originally 9/15/2009)  PAP AKA CERVICAL CYTOLOGY  08/16/2021  
 DTaP/Tdap/Td series (2 - Td) 06/17/2026  Pneumococcal 0-64 years  Completed Rosalind Pool is a 24 y.o. female and presents with Asthma (fmla) Subjective: 
Pt has h/o asthma. When I last saw her 9 mos ago, she reported good control of her asthma. However, more recently, she's been missing work b/c of asthma symptoms. She is requesting FMLA for this reason. She is on breo (although she reported to my nurse last month that she wasn't taking this). She reports needing her albuterol several times/day. She is on singulair as well. Asthma flares during allergy season. She can't afford allergy shots. ROS: 
Constitutional: No recent weight change. No weakness/fatigue. No f/c. Cardiovascular: No CP/palpitations. No PIZANO/orthopnea/PND. Respiratory: No cough/sputum,+ dyspnea,+ wheezing. Gastointestinal: No dysphagia, reflux. No n/v. No constipation/diarrhea. No melena/rectal bleeding. The problem list was updated as a part of today's visit. Patient Active Problem List  
Diagnosis Code  Moderate persistent asthma J45.40  Allergic rhinitis J30.9 The PSH,  were reviewed. SH: Social History Tobacco Use  Smoking status: Never Smoker  Smokeless tobacco: Never Used Substance Use Topics  Alcohol use: No  
 Drug use: No  
 
 
Medications/Allergies: 
Current Outpatient Medications on File Prior to Visit Medication Sig Dispense Refill  loratadine (CLARITIN) 10 mg tablet Take 10 mg by mouth.  montelukast (SINGULAIR) 10 mg tablet Take 1 Tab by mouth daily. 90 Tab 0  
 albuterol (PROVENTIL VENTOLIN) 2.5 mg /3 mL (0.083 %) nebulizer solution Take 3 mL by inhalation every four (4) hours as needed (by nebulizer every 4 hours as needed). 75 Each 1  
 fluticasone furoate-vilanterol (BREO ELLIPTA) 100-25 mcg/dose inhaler Take 1 Puff by inhalation daily. 3 Inhaler 0  
 albuterol (PROVENTIL HFA, VENTOLIN HFA, PROAIR HFA) 90 mcg/actuation inhaler Take 2 Puffs by inhalation every four (4) hours as needed for Wheezing or Shortness of Breath. 3 Inhaler 3  
 albuterol sulfate (PROVENTIL;VENTOLIN) 2.5 mg/0.5 mL nebu nebulizer solution 0.5 mL by Nebulization route every four (4) hours as needed for Wheezing. 20 mL 6  Nebulizer & Compressor machine 1 Each by Does Not Apply route every four (4) hours as needed. 1 Each 0  
 Nebulizer Accessories kit Hosing/tubing 1 Kit 0  
 EPINEPHrine (EPIPEN JR) 0.15 mg/0.3 mL (1:2,000) injection 0.15 mg by IntraMUSCular route once as needed.  norethindrone-ethinyl estradiol (LOESTRIN FE 1/20, 28,) 1-20 mg-mcg per tablet Take  by mouth.  cetirizine (ZYRTEC) 10 mg tablet Take  by mouth. No current facility-administered medications on file prior to visit. Allergies Allergen Reactions  Latex Anaphylaxis and Rash  Banana Anaphylaxis  Shellfish Derived Anaphylaxis Objective: 
Visit Vitals /70 (BP 1 Location: Left arm, BP Patient Position: Sitting) Pulse (!) 106 Temp 98.2 °F (36.8 °C) (Oral) Resp 18 Ht 5' 2\" (1.575 m) Wt 149 lb 12.8 oz (67.9 kg) LMP 03/16/2019 SpO2 98% BMI 27.40 kg/m² Constitutional: Well developed, nourished, no distress, alert CV: S1, S2.  RRR. No murmurs/rubs. No thrills palpated. Pulm: No abnormalities on inspection. Clear to auscultation bilaterally. +scattered wheezes. Normal effort.

## 2019-04-02 NOTE — LETTER
4/2/2019 10:02 AM 
 
Ms. Juan Zhang [phone] [facility] [address] To Whom It May Concern: 
 
Juan Zhang is currently under the care of [address] [place] [address]. She has moderate-severe asthma and is requesting accomodation in the workplace for this medical issue. I support her request for accomodation to allow for use of the nebulizer machine (which needs to be plugged in) in the workplace or allow for up to three unscheduled work breaks (15 minutes in duration) to provide her time to use her nebulizer in her car and then return to work. If there are questions or concerns please have the patient contact our office. Sincerely, Seven Rain MD

## 2019-04-02 NOTE — PROGRESS NOTES
Rosalind Pool is a 24 y.o. female (: 1995) presenting to address: Chief Complaint Patient presents with  Asthma Vitals:  
 19 0433 BP: 100/70 Pulse: (!) 106 Resp: 18 Temp: 98.2 °F (36.8 °C) TempSrc: Oral  
SpO2: 98% Weight: 149 lb 12.8 oz (67.9 kg) Height: 5' 2\" (1.575 m) PainSc:   6 PainLoc: Neck LMP: 2019 Hearing/Vision: No exam data present Learning Assessment:  
 
Learning Assessment 2014 PRIMARY LEARNER Patient HIGHEST LEVEL OF EDUCATION - PRIMARY LEARNER  SOME COLLEGE  
BARRIERS PRIMARY LEARNER NONE  
CO-LEARNER CAREGIVER No  
PRIMARY LANGUAGE ENGLISH  
LEARNER PREFERENCE PRIMARY DEMONSTRATION  
ANSWERED BY patient RELATIONSHIP SELF Depression Screening:  
 
3 most recent PHQ Screens 2019 Little interest or pleasure in doing things Not at all Feeling down, depressed, irritable, or hopeless Not at all Total Score PHQ 2 0 Fall Risk Assessment:  
 
Fall Risk Assessment, last 12 mths 2018 Able to walk? Yes Fall in past 12 months? No  
 
Abuse Screening:  
 
Abuse Screening Questionnaire 2019 Do you ever feel afraid of your partner? Euell Thais Are you in a relationship with someone who physically or mentally threatens you? Euell Thais Is it safe for you to go home? Omid Duarte Coordination of Care Questionaire: 1. Have you been to the ER, urgent care clinic since your last visit? Hospitalized since your last visit? Yes, [facility] neck spasm 2. Have you seen or consulted any other health care providers outside of the [address] [place] [address] since your last visit? Include any pap smears or colon screening. Yes eye exam [facility]  
 
Advanced Directive: 1. Do you have an Advanced Directive? No  
 
2. Would you like information on Advanced Directives? No  
 
 
There are no preventive care reminders to display for this patient.

## 2019-09-19 ENCOUNTER — TELEPHONE (OUTPATIENT)
Dept: FAMILY MEDICINE CLINIC | Age: 24
End: 2019-09-19

## 2019-09-19 DIAGNOSIS — J45.41 MODERATE PERSISTENT ASTHMA WITH ACUTE EXACERBATION: Primary | ICD-10-CM

## 2019-09-19 RX ORDER — NEBULIZER AND COMPRESSOR
EACH MISCELLANEOUS
Qty: 1 EACH | Refills: 0 | Status: SHIPPED | OUTPATIENT
Start: 2019-09-19

## 2019-09-19 NOTE — TELEPHONE ENCOUNTER
Pt called to request a script for a Nebulizer for the tubing and mouthpiece. The only place that carries it is [facility] and they need a perscription sent in for that. She is wanting to know if that can be done today because she is needing her nebulizer. I informed her that Dr Ileana Rees is not in today and was unsure if a different provider could do that for. Please advise and call the pt if it can be done today or if it has to wait for Dr Candice Dan return to office.

## 2019-09-19 NOTE — TELEPHONE ENCOUNTER
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 She is requesting this be sent today to [facility].

## 2019-09-26 RX ORDER — PREDNISONE 10 MG/1
TABLET ORAL
Qty: 21 TAB | Refills: 0 | Status: SHIPPED | OUTPATIENT
Start: 2019-09-26 | End: 2019-11-18 | Stop reason: SDUPTHER

## 2019-10-14 RX ORDER — ALBUTEROL SULFATE 0.83 MG/ML
SOLUTION RESPIRATORY (INHALATION)
Qty: 75 ML | Refills: 1 | Status: SHIPPED | OUTPATIENT
Start: 2019-10-14 | End: 2019-12-03 | Stop reason: SDUPTHER

## 2019-11-18 ENCOUNTER — OFFICE VISIT (OUTPATIENT)
Dept: FAMILY MEDICINE CLINIC | Age: 24
End: 2019-11-18

## 2019-11-18 VITALS
HEART RATE: 95 BPM | WEIGHT: 153.2 LBS | DIASTOLIC BLOOD PRESSURE: 71 MMHG | HEIGHT: 62 IN | RESPIRATION RATE: 16 BRPM | BODY MASS INDEX: 28.19 KG/M2 | TEMPERATURE: 98.1 F | OXYGEN SATURATION: 98 % | SYSTOLIC BLOOD PRESSURE: 105 MMHG

## 2019-11-18 DIAGNOSIS — J45.41 MODERATE PERSISTENT ASTHMA WITH ACUTE EXACERBATION: Primary | ICD-10-CM

## 2019-11-18 RX ORDER — PREDNISONE 10 MG/1
TABLET ORAL
Qty: 21 TAB | Refills: 1 | Status: SHIPPED | OUTPATIENT
Start: 2019-11-18 | End: 2020-03-31 | Stop reason: ALTCHOICE

## 2019-11-18 NOTE — PROGRESS NOTES
Assessment/Plan:    1. Moderate persistent asthma with acute exacerbation  -on maximum medical therapy. Ck IgE, referral pulm for eval for xolair.  - CBC WITH AUTOMATED DIFF; Future  - IMMUNOGLOBULIN E, QT; Future  - REFERRAL TO PULMONARY DISEASE  - predniSONE (STERAPRED DS) 10 mg dose pack; See administration instruction per 10mg dose pack  Dispense: 21 Tab; Refill: 1      The plan was discussed with the patient. The patient verbalized understanding and is in agreement with the plan. All medication potential side effects were discussed with the patient. Health Maintenance:   Health Maintenance   Topic Date Due    HPV Age 9Y-34Y (3 - Female 3-dose series) 01/01/2030 (Originally 9/15/2009)    PAP AKA CERVICAL CYTOLOGY  08/16/2021    DTaP/Tdap/Td series (2 - Td) 06/17/2026    Influenza Age 9 to Adult  Completed    Pneumococcal 0-64 years  Completed       Maki Xie is a 21 y.o. female and presents with Asthma     Subjective:  Asthma - on singulair, breo and inhaled anticholinergic. Additionally, getting allergy immunotherapy (started 9/2019). Using albuterol 4x/day. She has nighttime awakenings every night. shemanages allergies well - using allergen pillowcases, using special air filters. ROS:  Constitutional: No recent weight change. No weakness/fatigue. No f/c. Cardiovascular: No CP/palpitations. No PIZANO/orthopnea/PND. Respiratory: + cough/no sputum, dyspnea, +wheezing. Gastointestinal: No dysphagia, reflux. No n/v. No constipation/diarrhea. No melena/rectal bleeding. The problem list was updated as a part of today's visit. Patient Active Problem List   Diagnosis Code    Moderate persistent asthma J45.40    Allergic rhinitis J30.9       The PSH, FH were reviewed.     SH:  Social History     Tobacco Use    Smoking status: Never Smoker    Smokeless tobacco: Never Used   Substance Use Topics    Alcohol use: No    Drug use: No       Medications/Allergies:  Current Outpatient Medications on File Prior to Visit   Medication Sig Dispense Refill    albuterol (PROVENTIL VENTOLIN) 2.5 mg /3 mL (0.083 %) nebu inhale contents of 1 vial in nebulizer every 4 hours if needed 75 mL 1    umeclidinium (INCRUSE ELLIPTA) 62.5 mcg/actuation inhaler Take 1 Puff by inhalation daily. 1 Inhaler 0    predniSONE (STERAPRED DS) 10 mg dose pack See administration instruction per 10mg dose pack 21 Tab 0    Nebulizer & Compressor machine For use q 6 hours as needed. Indications: wheezing 1 Each 0    Nebulizer Accessories kit For use q 6 hours as needed. 1 Kit 0    loratadine (CLARITIN) 10 mg tablet Take 10 mg by mouth.  fluticasone furoate-vilanterol (BREO ELLIPTA) 200-25 mcg/dose inhaler Take 1 Puff by inhalation daily. 90 Each 0    montelukast (SINGULAIR) 10 mg tablet Take 1 Tab by mouth daily. 90 Tab 3    albuterol (PROVENTIL HFA, VENTOLIN HFA, PROAIR HFA) 90 mcg/actuation inhaler Take 2 Puffs by inhalation every four (4) hours as needed for Wheezing or Shortness of Breath. 3 Inhaler 3    albuterol sulfate (PROVENTIL;VENTOLIN) 2.5 mg/0.5 mL nebu nebulizer solution 0.5 mL by Nebulization route every four (4) hours as needed for Wheezing. 20 mL 6    EPINEPHrine (EPIPEN JR) 0.15 mg/0.3 mL (1:2,000) injection 0.15 mg by IntraMUSCular route once as needed.  norethindrone-ethinyl estradiol (LOESTRIN FE 1/20, 28,) 1-20 mg-mcg per tablet Take  by mouth. No current facility-administered medications on file prior to visit.          Allergies   Allergen Reactions    Latex Anaphylaxis and Rash    Banana Anaphylaxis    Shellfish Derived Anaphylaxis       Objective:  Visit Vitals  /71 (BP 1 Location: Left arm, BP Patient Position: Sitting)   Pulse 95   Temp 98.1 °F (36.7 °C) (Oral)   Resp 16   Ht 5' 2\" (1.575 m)   Wt 153 lb 3.2 oz (69.5 kg)   LMP 11/08/2019 (Exact Date)   SpO2 98%   BMI 28.02 kg/m²      Constitutional: Well developed, nourished, no distress, alert   HENT: Exterior ears and tympanic membranes normal bilaterally. Supple neck. No thyromegaly or lymphadenopathy. Oropharynx clear and moist mucous membranes. Normal inferior turbinates. Septum midline. No nasal polyps. +bilat middle ear effusions   Eyes: Conjunctiva normal. PERRL. Eyelids normal.   CV: S1, S2.  RRR. No murmurs/rubs. No edema. Pulm: No abnormalities on inspection. Clear to auscultation bilaterally. No wheezing/rhonchi. Normal effort.

## 2019-11-18 NOTE — PROGRESS NOTES
Gisele Mcfadden is a 21 y.o. female (: 1995) presenting to address:    Chief Complaint   Patient presents with    Asthma       Vitals:    19 1154   BP: 105/71   Pulse: 95   Resp: 16   Temp: 98.1 °F (36.7 °C)   TempSrc: Oral   SpO2: 98%   Weight: 153 lb 3.2 oz (69.5 kg)   Height: 5' 2\" (1.575 m)   PainSc:   0 - No pain   LMP: 2019       Hearing/Vision:   No exam data present    Learning Assessment:     Learning Assessment 2014   PRIMARY LEARNER Patient   HIGHEST LEVEL OF EDUCATION - PRIMARY LEARNER  SOME COLLEGE   BARRIERS PRIMARY LEARNER NONE   CO-LEARNER CAREGIVER No   PRIMARY LANGUAGE ENGLISH   LEARNER PREFERENCE PRIMARY DEMONSTRATION   ANSWERED BY patient   RELATIONSHIP SELF     Depression Screening:     3 most recent PHQ Screens 2019   Little interest or pleasure in doing things Not at all   Feeling down, depressed, irritable, or hopeless Not at all   Total Score PHQ 2 0     Fall Risk Assessment:     Fall Risk Assessment, last 12 mths 2018   Able to walk? Yes   Fall in past 12 months? No     Abuse Screening:     Abuse Screening Questionnaire 2019   Do you ever feel afraid of your partner? N   Are you in a relationship with someone who physically or mentally threatens you? N   Is it safe for you to go home? Y     Coordination of Care Questionaire:   1. Have you been to the ER, urgent care clinic since your last visit? Hospitalized since your last visit? NO    2. Have you seen or consulted any other health care providers outside of the [address] [place] [address] since your last visit? Include any pap smears or colon screening. YES  Allergist     Advanced Directive:   1. Do you have an Advanced Directive? NO    2. Would you like information on Advanced Directives?  NO

## 2019-12-03 RX ORDER — ALBUTEROL SULFATE 0.83 MG/ML
SOLUTION RESPIRATORY (INHALATION)
Qty: 75 ML | Refills: 1 | Status: SHIPPED | OUTPATIENT
Start: 2019-12-03 | End: 2020-03-16

## 2020-01-16 DIAGNOSIS — J45.40 MODERATE PERSISTENT ASTHMA, UNCOMPLICATED: ICD-10-CM

## 2020-01-17 RX ORDER — ALBUTEROL SULFATE 90 UG/1
AEROSOL, METERED RESPIRATORY (INHALATION)
Qty: 54 G | Refills: 3 | Status: SHIPPED | OUTPATIENT
Start: 2020-01-17

## 2020-03-16 ENCOUNTER — OFFICE VISIT (OUTPATIENT)
Dept: FAMILY MEDICINE CLINIC | Age: 25
End: 2020-03-16

## 2020-03-16 VITALS
DIASTOLIC BLOOD PRESSURE: 89 MMHG | RESPIRATION RATE: 20 BRPM | SYSTOLIC BLOOD PRESSURE: 136 MMHG | OXYGEN SATURATION: 97 % | HEIGHT: 62 IN | WEIGHT: 153 LBS | BODY MASS INDEX: 28.16 KG/M2 | TEMPERATURE: 98.9 F | HEART RATE: 99 BPM

## 2020-03-16 DIAGNOSIS — J45.41 MODERATE PERSISTENT ASTHMA WITH EXACERBATION: Primary | ICD-10-CM

## 2020-03-16 RX ORDER — AZITHROMYCIN 250 MG/1
TABLET, FILM COATED ORAL
Qty: 6 TAB | Refills: 0 | Status: SHIPPED | OUTPATIENT
Start: 2020-03-16 | End: 2020-03-21

## 2020-03-16 RX ORDER — BENZONATATE 200 MG/1
200 CAPSULE ORAL
Qty: 30 CAP | Refills: 0 | Status: SHIPPED | OUTPATIENT
Start: 2020-03-16 | End: 2020-03-31 | Stop reason: ALTCHOICE

## 2020-03-16 RX ORDER — ALBUTEROL SULFATE 0.83 MG/ML
SOLUTION RESPIRATORY (INHALATION)
Qty: 75 ML | Refills: 1 | Status: SHIPPED | OUTPATIENT
Start: 2020-03-16 | End: 2020-05-01 | Stop reason: SDUPTHER

## 2020-03-16 RX ORDER — PREDNISONE 10 MG/1
TABLET ORAL
Qty: 21 TAB | Refills: 0 | Status: SHIPPED | OUTPATIENT
Start: 2020-03-16 | End: 2020-03-31 | Stop reason: ALTCHOICE

## 2020-03-16 NOTE — PROGRESS NOTES
Shaina Graff is a 21 y.o. female (: 1995) presenting to address:    Chief Complaint   Patient presents with    Cough       Vitals:    20 1411   BP: 136/89   Pulse: 99   Resp: 20   Temp: 98.9 °F (37.2 °C)   TempSrc: Oral   SpO2: 97%   Weight: 153 lb (69.4 kg)   Height: 5' 2\" (1.575 m)   PainSc:   0 - No pain   LMP: 2020       Learning Assessment:     Learning Assessment 2014   PRIMARY LEARNER Patient   HIGHEST LEVEL OF EDUCATION - PRIMARY LEARNER  SOME COLLEGE   BARRIERS PRIMARY LEARNER NONE   CO-LEARNER CAREGIVER No   PRIMARY LANGUAGE ENGLISH   LEARNER PREFERENCE PRIMARY DEMONSTRATION   ANSWERED BY patient   RELATIONSHIP SELF     Depression Screening:     3 most recent PHQ Screens 2019   Little interest or pleasure in doing things Not at all   Feeling down, depressed, irritable, or hopeless Not at all   Total Score PHQ 2 0     Fall Risk Assessment:     Fall Risk Assessment, last 12 mths 2018   Able to walk? Yes   Fall in past 12 months? No     Abuse Screening:     Abuse Screening Questionnaire 2019   Do you ever feel afraid of your partner? N   Are you in a relationship with someone who physically or mentally threatens you? N   Is it safe for you to go home? Y     Coordination of Care Questionaire:   1. Have you been to the ER, urgent care clinic since your last visit? Hospitalized since your last visit? NO    2. Have you seen or consulted any other health care providers outside of the [address] [place] [address] since your last visit? Include any pap smears or colon screening. YES - pulmonology, allergy    Advanced Directive:   1. Do you have an Advanced Directive? YES    2. Would you like information on Advanced Directives?  NO

## 2020-03-16 NOTE — LETTER
NOTIFICATION RETURN TO WORK / SCHOOL 
 
3/16/2020 2:45 PM 
 
Ms. [address] 
[address], [address] [phone] To Whom It May Concern: 
 
[address] is currently under the care of [address] [place] [address]. She will return to work/school on: March 17, 2020 If there are questions or concerns please have the patient contact our office. Sincerely, Nuzhat Rivers MD

## 2020-03-16 NOTE — PROGRESS NOTES
HISTORY OF PRESENT ILLNESS  Kiley Felix is a 21 y.o. female. HPI  C/o dry cough, associated with increase wheezing, started 3 days ago, she has been using her nebulizer more frequently for her wheezing, she just used it before coming here, no wheezing now  No s/t, no fever or chills, no SOB  Review of Systems   Constitutional: Negative for chills and fever. HENT: Negative for ear pain and sore throat. Respiratory: Negative for hemoptysis and shortness of breath. Gastrointestinal: Negative for nausea. Physical Exam  Vitals signs reviewed. HENT:      Right Ear: Tympanic membrane and ear canal normal.      Left Ear: Tympanic membrane and ear canal normal.      Mouth/Throat:      Pharynx: No posterior oropharyngeal erythema. Cardiovascular:      Rate and Rhythm: Normal rate and regular rhythm. Heart sounds: Normal heart sounds. Pulmonary:      Effort: Pulmonary effort is normal. No respiratory distress. Breath sounds: Normal breath sounds. No wheezing or rhonchi. Lymphadenopathy:      Cervical: No cervical adenopathy. ASSESSMENT and PLAN  Diagnoses and all orders for this visit:    1. Moderate persistent asthma with exacerbation  -     azithromycin (ZITHROMAX) 250 mg tablet; Take 2 tablets today, then take 1 tablet daily  -     predniSONE (STERAPRED DS) 10 mg dose pack; See administration instruction per 10mg dose pack  -     benzonatate (TESSALON) 200 mg capsule; Take 1 Cap by mouth three (3) times daily as needed for Cough.

## 2020-03-31 ENCOUNTER — VIRTUAL VISIT (OUTPATIENT)
Dept: FAMILY MEDICINE CLINIC | Age: 25
End: 2020-03-31

## 2020-03-31 DIAGNOSIS — J45.40 MODERATE PERSISTENT ASTHMA WITHOUT COMPLICATION: Primary | ICD-10-CM

## 2020-03-31 NOTE — PROGRESS NOTES
Isela Young is a 21 y.o. female (: 1995) presenting to address:    Chief Complaint   Patient presents with    Other     FMLA       There were no vitals filed for this visit. Hearing/Vision:   No exam data present    Learning Assessment:     Learning Assessment 2014   PRIMARY LEARNER Patient   HIGHEST LEVEL OF EDUCATION - PRIMARY LEARNER  SOME COLLEGE   BARRIERS PRIMARY LEARNER NONE   CO-LEARNER CAREGIVER No   PRIMARY LANGUAGE ENGLISH   LEARNER PREFERENCE PRIMARY DEMONSTRATION   ANSWERED BY patient   RELATIONSHIP SELF     Depression Screening:     3 most recent PHQ Screens 2019   Little interest or pleasure in doing things Not at all   Feeling down, depressed, irritable, or hopeless Not at all   Total Score PHQ 2 0     Fall Risk Assessment:     Fall Risk Assessment, last 12 mths 3/31/2020   Able to walk? Yes   Fall in past 12 months? No     Abuse Screening:     Abuse Screening Questionnaire 3/31/2020   Do you ever feel afraid of your partner? N   Are you in a relationship with someone who physically or mentally threatens you? N   Is it safe for you to go home? Y     Coordination of Care Questionaire:   1. Have you been to the ER, urgent care clinic since your last visit? Hospitalized since your last visit? NO    2. Have you seen or consulted any other health care providers outside of the [address] [place] [address] since your last visit? Include any pap smears or colon screening. YES, Pulmonary and Allergist    Advanced Directive:   1. Do you have an Advanced Directive? NO    2. Would you like information on Advanced Directives?  NO  Patient said number on file was fine

## 2020-03-31 NOTE — PROGRESS NOTES
Carmen Steele is a 21 y.o. female who was seen by synchronous (real-time) audio-video technology on 3/31/2020. Consent:  She and/or her healthcare decision maker is aware that this patient-initiated Telehealth encounter is a billable service, with coverage as determined by her insurance carrier. She is aware that she may receive a bill and has provided verbal consent to proceed: Yes    I was in the office while conducting this encounter. Patient was at home. Karma Osman MA participated      Assessment & Plan:   Diagnoses and all orders for this visit:    1. Moderate persistent asthma without complication    -cont current. FMLA paperwork completed. Subjective:   Carmen Steele was seen for Other (FMLA)    Asthma - she is getting allergy shots, but that is on hold for now. Also has appt with pulm upcoming. She is currently working from home due to corona virus outbreak. Asthma is well controlled currently. Prior to Admission medications    Medication Sig Start Date End Date Taking? Authorizing Provider   albuterol (PROVENTIL VENTOLIN) 2.5 mg /3 mL (0.083 %) nebu inhale contents of 1 vial ( 3 milliliters ) in nebulizer by mouth and INTO THE LUNGS every 4 hours if needed 3/16/20  Yes Dwayne Santos MD   VENTOLIN HFA 90 mcg/actuation inhaler inhale 2 puffs by mouth every 4 hours if needed for wheezing or shortness of breath 1/17/20  Yes Dwayne Santos MD   umeclidinium (INCRUSE ELLIPTA) 62.5 mcg/actuation inhaler inhale 1 puff by mouth and INTO THE LUNGS once daily 1/17/20  Yes Tej Cheng MD   Nebulizer & Compressor machine For use q 6 hours as needed. Indications: wheezing 9/19/19  Yes Margarito Matos MD   Nebulizer Accessories kit For use q 6 hours as needed. 9/19/19  Yes Margarito Matos MD   loratadine (CLARITIN) 10 mg tablet Take 10 mg by mouth.    Yes Provider, Historical   fluticasone furoate-vilanterol (BREO ELLIPTA) 200-25 mcg/dose inhaler Take 1 Puff by inhalation daily. 4/2/19  Yes Elaine Santos MD   montelukast (SINGULAIR) 10 mg tablet Take 1 Tab by mouth daily. 4/2/19  Yes Elaine Santos MD   EPINEPHrine (EPIPEN JR) 0.15 mg/0.3 mL (1:2,000) injection 0.15 mg by IntraMUSCular route once as needed. Yes Provider, Historical   norethindrone-ethinyl estradiol (LOESTRIN FE 1/20, 28,) 1-20 mg-mcg per tablet Take  by mouth. Yes Provider, Historical     Allergies   Allergen Reactions    Latex Anaphylaxis and Rash    Banana Anaphylaxis    Shellfish Derived Anaphylaxis       Patient Active Problem List   Diagnosis Code    Moderate persistent asthma J45.40    Allergic rhinitis J30.9       Review of Systems   Constitutional: Negative for chills and fever. Respiratory: Negative for cough, sputum production, shortness of breath and wheezing.         PHYSICAL EXAMINATION:  [ INSTRUCTIONS:  \"[x]\" Indicates a positive item  \"[]\" Indicates a negative item  -- DELETE ALL ITEMS NOT EXAMINED]  Vital Signs: (As obtained by patient/caregiver at home)  Visit Vitals  LMP 03/23/2020 (Exact Date)        Constitutional: [x] Appears well-developed and well-nourished [x] No apparent distress      [] Abnormal -     Mental status: [x] Alert and awake  [x] Oriented to person/place/time [x] Able to follow commands    [] Abnormal -     Eyes:   EOM    [x]  Normal    [] Abnormal -   Sclera  [x]  Normal    [] Abnormal -          Discharge [x]  None visible   [] Abnormal -     HENT: [x] Normocephalic, atraumatic  [] Abnormal -   [x] Mouth/Throat: Mucous membranes are moist    External Ears [x] Normal  [] Abnormal -    Neck: [x] No visualized mass [] Abnormal -     Pulmonary/Chest: [x] Respiratory effort normal   [x] No visualized signs of difficulty breathing or respiratory distress        [] Abnormal -      Musculoskeletal:   [x] Normal gait with no signs of ataxia         [x] Normal range of motion of neck        [] Abnormal -     Neurological:        [x] No Facial Asymmetry (Cranial nerve 7 motor function) (limited exam due to video visit)          [x] No gaze palsy        [] Abnormal -          Skin:        [x] No significant exanthematous lesions or discoloration noted on facial skin         [] Abnormal -            Psychiatric:       [x] Normal Affect [] Abnormal -        [x] No Hallucinations    Other pertinent observable physical exam findings:-        We discussed the expected course, resolution and complications of the diagnosis(es) in detail. Medication risks, benefits, costs, interactions, and alternatives were discussed as indicated. I advised her to contact the office if her condition worsens, changes or fails to improve as anticipated. She expressed understanding with the diagnosis(es) and plan. Pursuant to the emergency declaration under the [facility], [facility] waiver authority and the [employer] and [employer] Act, this Virtual  Visit was conducted, with patient's consent, to reduce the patient's risk of exposure to COVID-19 and provide continuity of care for an established patient. Services were provided through a video synchronous discussion virtually to substitute for in-person clinic visit.     Gege Harper MD

## 2020-04-13 DIAGNOSIS — J45.41 MODERATE PERSISTENT ASTHMA WITH ACUTE EXACERBATION: ICD-10-CM

## 2020-04-14 RX ORDER — FLUTICASONE FUROATE AND VILANTEROL TRIFENATATE 200; 25 UG/1; UG/1
POWDER RESPIRATORY (INHALATION)
Qty: 180 EACH | Refills: 1 | Status: SHIPPED | OUTPATIENT
Start: 2020-04-14 | End: 2020-11-30 | Stop reason: ALTCHOICE

## 2020-04-14 RX ORDER — MONTELUKAST SODIUM 10 MG/1
TABLET ORAL
Qty: 90 TAB | Refills: 3 | Status: SHIPPED | OUTPATIENT
Start: 2020-04-14 | End: 2021-11-19

## 2020-04-14 RX ORDER — UMECLIDINIUM 62.5 UG/1
AEROSOL, POWDER ORAL
Qty: 3 INHALER | Refills: 1 | Status: SHIPPED | OUTPATIENT
Start: 2020-04-14 | End: 2020-08-31 | Stop reason: ALTCHOICE

## 2020-04-22 RX ORDER — PREDNISONE 10 MG/1
TABLET ORAL
Qty: 21 TAB | Refills: 0 | Status: SHIPPED | OUTPATIENT
Start: 2020-04-22 | End: 2020-08-31 | Stop reason: SDUPTHER

## 2020-05-01 RX ORDER — ALBUTEROL SULFATE 0.83 MG/ML
2.5 SOLUTION RESPIRATORY (INHALATION)
Qty: 75 ML | Refills: 1 | Status: SHIPPED | OUTPATIENT
Start: 2020-05-01

## 2020-08-31 ENCOUNTER — VIRTUAL VISIT (OUTPATIENT)
Dept: FAMILY MEDICINE CLINIC | Age: 25
End: 2020-08-31

## 2020-08-31 DIAGNOSIS — J45.41 MODERATE PERSISTENT ASTHMA WITH EXACERBATION: Primary | ICD-10-CM

## 2020-08-31 RX ORDER — PREDNISONE 10 MG/1
TABLET ORAL
Qty: 21 TAB | Refills: 0 | Status: SHIPPED | OUTPATIENT
Start: 2020-08-31

## 2020-08-31 NOTE — PROGRESS NOTES
Janene Vasquez is a 21 y.o. female who was seen by synchronous (real-time) audio-video technology on 8/31/2020 for Asthma        Assessment & Plan:   Diagnoses and all orders for this visit:    1. Moderate persistent asthma with exacerbation  -     predniSONE (STERAPRED DS) 10 mg dose pack; See administration instruction per 10mg dose pack      Subjective:   Pt states she's had several day h/o asthma exacerbation. Her asthma had been better since leaving [facility] and has been able to stop incruse. Having wheezing, using nebs 3x/day. Prior to Admission medications    Medication Sig Start Date End Date Taking? Authorizing Provider   albuterol (PROVENTIL VENTOLIN) 2.5 mg /3 mL (0.083 %) nebu Take 3 mL by inhalation every four (4) hours as needed for Wheezing. 5/1/20   Nancy Martinez MD   predniSONE (STERAPRED DS) 10 mg dose pack See administration instruction per 10mg dose pack 4/22/20   Nancy Martinez MD   Willaim Stevie 032-60 mcg/dose inhaler inhalation 1 puff by mouth once daily 4/14/20   Nancy Martinez MD   Incruse Ellipta 62.5 mcg/actuation inhaler inhale 1 puff by mouth and INTO THE LUNGS once daily 4/14/20   Nancy Martinez MD   montelukast (SINGULAIR) 10 mg tablet take 1 tablet by mouth once daily 4/14/20   Nancy Martinez MD   VENTOLIN HFA 90 mcg/actuation inhaler inhale 2 puffs by mouth every 4 hours if needed for wheezing or shortness of breath 1/17/20   Mario Martyr Leopold Hensen, MD   Nebulizer & Compressor machine For use q 6 hours as needed. Indications: wheezing 9/19/19   Kush Hawthorne MD   Nebulizer Accessories kit For use q 6 hours as needed. 9/19/19   Kush Hawthorne MD   loratadine (CLARITIN) 10 mg tablet Take 10 mg by mouth. Provider, Historical   EPINEPHrine (EPIPEN JR) 0.15 mg/0.3 mL (1:2,000) injection 0.15 mg by IntraMUSCular route once as needed. Provider, Historical   norethindrone-ethinyl estradiol (LOESTRIN FE 1/20, 28,) 1-20 mg-mcg per tablet Take  by mouth.     Provider, Historical     Patient Active Problem List   Diagnosis Code    Moderate persistent asthma J45.40    Allergic rhinitis J30.9       Review of Systems   Constitutional: Negative for chills and fever. Respiratory: Positive for shortness of breath and wheezing. Negative for cough. Objective:   No flowsheet data found. [INSTRUCTIONS:  \"[x]\" Indicates a positive item  \"[]\" Indicates a negative item  -- DELETE ALL ITEMS NOT EXAMINED]    Constitutional: [x] Appears well-developed and well-nourished [x] No apparent distress      [] Abnormal -     Mental status: [x] Alert and awake  [x] Oriented to person/place/time [x] Able to follow commands    [] Abnormal -     Eyes:   EOM    [x]  Normal    [] Abnormal -   Sclera  [x]  Normal    [] Abnormal -          Discharge [x]  None visible   [] Abnormal -     HENT: [x] Normocephalic, atraumatic  [] Abnormal -   [x] Mouth/Throat: Mucous membranes are moist    External Ears [x] Normal  [] Abnormal -    Neck: [x] No visualized mass [] Abnormal -     Pulmonary/Chest: [x] Respiratory effort normal   [x] No visualized signs of difficulty breathing or respiratory distress        [] Abnormal -      Musculoskeletal:   [] Normal gait with no signs of ataxia         [] Normal range of motion of neck        [] Abnormal -     Neurological:        [] No Facial Asymmetry (Cranial nerve 7 motor function) (limited exam due to video visit)          [] No gaze palsy        [] Abnormal -          Skin:        [] No significant exanthematous lesions or discoloration noted on facial skin         [] Abnormal -            Psychiatric:       [x] Normal Affect [] Abnormal -        [x] No Hallucinations    Other pertinent observable physical exam findings:-        We discussed the expected course, resolution and complications of the diagnosis(es) in detail. Medication risks, benefits, costs, interactions, and alternatives were discussed as indicated.   I advised her to contact the office if her condition worsens, changes or fails to improve as anticipated. She expressed understanding with the diagnosis(es) and plan. Mike Olmedo, who was evaluated through a patient-initiated, synchronous (real-time) audio-video encounter, and/or her healthcare decision maker, is aware that it is a billable service, with coverage as determined by her insurance carrier. She provided verbal consent to proceed: Yes, and patient identification was verified. It was conducted pursuant to the emergency declaration under the [address] [place] [address], [address] [place] [address] and the [employer] and [employer] General Act. A caregiver was present when appropriate. Ability to conduct physical exam was limited. I was at home. The patient was at home.       Wilson Humphrey MD

## 2020-10-26 DIAGNOSIS — R19.7 DIARRHEA OF PRESUMED INFECTIOUS ORIGIN: Primary | ICD-10-CM

## 2020-10-27 ENCOUNTER — VIRTUAL VISIT (OUTPATIENT)
Dept: FAMILY MEDICINE CLINIC | Age: 25
End: 2020-10-27

## 2020-10-27 DIAGNOSIS — R19.7 DIARRHEA OF PRESUMED INFECTIOUS ORIGIN: Primary | ICD-10-CM

## 2020-10-27 PROCEDURE — 99441 PR PHYS/QHP TELEPHONE EVALUATION 5-10 MIN: CPT | Performed by: INTERNAL MEDICINE

## 2020-10-27 NOTE — PROGRESS NOTES
Nolan Rivas is a 31 y.o. female evaluated via telephone on 10/27/2020. Consent:  She and/or health care decision maker is aware that that she may receive a bill for this telephone service, depending on her insurance coverage, and has provided verbal consent to proceed: Yes      Documentation:  I communicated with the patient and/or health care decision maker about:  She sees 'white grains' of rice in her stool. Cat dx with tape worms recently. No blood in stool. Stool is diarrhea and loose. This has been ongoing x 5 days. +nausea. +abd pain (generalized). No fevers. Having nocturnal bm. No tenesmus. +urgency. No antibiotics recently. Details of this discussion including any medical advice provided:   Diarrhea - ddx includes infectious vs IBD. Ck stool studies. Avoid anti-diarrheals for now. I affirm this is a Patient Initiated Episode with an Established Patient who has not had a related appointment within my department in the past 7 days or scheduled within the next 24 hours.     Total Time: minutes: 5-10 minutes    Note: not billable if this call serves to triage the patient into an appointment for the relevant concern  This service was provided through (Telehealth, Virtual Check In or E-Visit), both the patient at home and the provider at [facility]  and the [facility] at [place] CORNELL Markham MD

## 2020-10-30 LAB
CLOSTRIDIUM DIFFICILE ASSAY, 892: NEGATIVE
RESULT: NORMAL
SHIGA TOXIN EIA, 16335: NEGATIVE

## 2020-11-30 RX ORDER — BUDESONIDE AND FORMOTEROL FUMARATE DIHYDRATE 160; 4.5 UG/1; UG/1
2 AEROSOL RESPIRATORY (INHALATION) 2 TIMES DAILY
Qty: 3 INHALER | Refills: 0 | Status: SHIPPED | OUTPATIENT
Start: 2020-11-30 | End: 2021-11-19

## 2021-01-28 ENCOUNTER — HOSPITAL ENCOUNTER (EMERGENCY)
Age: 26
Discharge: HOME | End: 2021-01-28
Attending: EMERGENCY MEDICINE
Payer: COMMERCIAL

## 2021-01-28 VITALS
HEART RATE: 72 BPM | SYSTOLIC BLOOD PRESSURE: 125 MMHG | RESPIRATION RATE: 16 BRPM | TEMPERATURE: 98.3 F | BODY MASS INDEX: 29.08 KG/M2 | OXYGEN SATURATION: 99 % | HEIGHT: 62 IN | WEIGHT: 158 LBS | DIASTOLIC BLOOD PRESSURE: 79 MMHG

## 2021-01-28 DIAGNOSIS — Z20.822 LAB TEST NEGATIVE FOR COVID-19 VIRUS: Primary | ICD-10-CM

## 2021-01-28 LAB
COVID-19 RAPID TEST, COVR: NOT DETECTED
SARS-COV-2, COV2: NORMAL
SOURCE, COVRS: NORMAL

## 2021-01-28 PROCEDURE — 74011250636 HC RX REV CODE- 250/636: Performed by: PHYSICIAN ASSISTANT

## 2021-01-28 PROCEDURE — 74011250637 HC RX REV CODE- 250/637: Performed by: PHYSICIAN ASSISTANT

## 2021-01-28 PROCEDURE — 99283 EMERGENCY DEPT VISIT LOW MDM: CPT

## 2021-01-28 PROCEDURE — 87635 SARS-COV-2 COVID-19 AMP PRB: CPT

## 2021-01-28 RX ORDER — ACETAMINOPHEN 500 MG
1000 TABLET ORAL
Status: COMPLETED | OUTPATIENT
Start: 2021-01-28 | End: 2021-01-28

## 2021-01-28 RX ADMIN — ACETAMINOPHEN 1000 MG: 500 TABLET, FILM COATED ORAL at 14:12

## 2021-01-28 RX ADMIN — SODIUM CHLORIDE 1000 ML: 9 INJECTION, SOLUTION INTRAVENOUS at 14:12

## 2021-01-28 NOTE — Clinical Note
[facility] EMERGENCY DEPT 
[address] 
[address] [facility] [address] [phone] Work/School Note Date: 1/28/2021 To Whom It May concern: 
 
Dimas Blount was evaulated by the following provider(s): 
Attending Provider: Jeanine Tafoya MD 
Physician Assistant: Aguilar Davisve virus is suspected. Per the CDC guidelines we recommend home isolation until the following conditions are all met: 1. At least 10 days have passed since symptoms first appeared and 2. At least 24 hours have passed since last fever without the use of fever-reducing medications and 
3. Symptoms (e.g., cough, shortness of breath) have improved Sincerely, MAITE Triana

## 2021-01-28 NOTE — ED TRIAGE NOTES
Patient was exposed to her boyfriend on Tuesday, boyfriend was tested positive on Friday, patient wants to be tested for covid, has not taken any pain meds for the headache

## 2021-01-28 NOTE — ED PROVIDER NOTES
EMERGENCY DEPARTMENT HISTORY AND PHYSICAL EXAM    Date: 1/28/2021  Patient Name: Maribel Terrazas    History of Presenting Illness     Chief Complaint   Patient presents with    Headache    Covid Testing         History Provided By: Patient     Chief Complaint: HA, Covid contact  Duration: 4 days ago  Timing: Gradual  Location: Diffuse head  Quality: Aching  Severity: Moderate  Modifying Factors: Worse after being around her boyfriend who tested positive for Covid  Associated Symptoms: none       Additional History (Context): Maribel Terrazas is a 31 y.o. female with a history of asthma who presents today for issues listed above. Patient reports that her boyfriend tested positive for Covid this week. Patient reports her only symptom is a headache currently. Patient denies any shortness of breath, wheezing, cough, body aches, sore throat or fever. Reports she has plenty of albuterol at home as well as a course of prednisone should she need it. Patient denies ever being admitted to the hospital for asthma or intubation. States she did try some over-the-counter medication for her headache earlier this morning. PCP: Alena Seymour MD    Current Outpatient Medications   Medication Sig Dispense Refill    budesonide-formoteroL (SYMBICORT) 160-4.5 mcg/actuation HFAA Take 2 Puffs by inhalation two (2) times a day. 3 Inhaler 0    albuterol (PROVENTIL VENTOLIN) 2.5 mg /3 mL (0.083 %) nebu Take 3 mL by inhalation every four (4) hours as needed for Wheezing. 75 mL 1    VENTOLIN HFA 90 mcg/actuation inhaler inhale 2 puffs by mouth every 4 hours if needed for wheezing or shortness of breath 54 g 3    norethindrone-ethinyl estradiol (LOESTRIN FE 1/20, 28,) 1-20 mg-mcg per tablet Take  by mouth.       predniSONE (STERAPRED DS) 10 mg dose pack See administration instruction per 10mg dose pack 21 Tab 0    montelukast (SINGULAIR) 10 mg tablet take 1 tablet by mouth once daily 90 Tab 3    Nebulizer & Compressor machine For use q 6 hours as needed. Indications: wheezing 1 Each 0    Nebulizer Accessories kit For use q 6 hours as needed. 1 Kit 0    loratadine (CLARITIN) 10 mg tablet Take 10 mg by mouth.  EPINEPHrine (EPIPEN JR) 0.15 mg/0.3 mL (1:2,000) injection 0.15 mg by IntraMUSCular route once as needed. Past History     Past Medical History:  Past Medical History:   Diagnosis Date    Moderate persistent asthma     also exercise-induced       Past Surgical History:  History reviewed. No pertinent surgical history. Family History:  History reviewed. No pertinent family history. Social History:  Social History     Tobacco Use    Smoking status: Never Smoker    Smokeless tobacco: Never Used   Substance Use Topics    Alcohol use: Yes     Comment: occ    Drug use: No       Allergies: Allergies   Allergen Reactions    Latex Anaphylaxis and Rash    Banana Anaphylaxis    Shellfish Derived Anaphylaxis         Review of Systems   Review of Systems   Constitutional: Negative for chills and fever. HENT: Negative for congestion, rhinorrhea and sore throat. Respiratory: Negative for cough and shortness of breath. Cardiovascular: Negative for chest pain. Gastrointestinal: Negative for abdominal pain, blood in stool, constipation, diarrhea, nausea and vomiting. Genitourinary: Negative for dysuria, frequency and hematuria. Musculoskeletal: Negative for back pain and myalgias. Skin: Negative for rash and wound. Neurological: Positive for headaches. Negative for dizziness. All other systems reviewed and are negative. All Other Systems Negative  Physical Exam     Vitals:    01/28/21 1320 01/28/21 1400 01/28/21 1435 01/28/21 1456   BP: 130/63   125/79   Pulse: (!) 118 (!) 123 74 72   Resp: 18  16 16   Temp: 98.3 °F (36.8 °C)      SpO2: 97% 97% 94% 99%   Weight:       Height:         Physical Exam  Vitals signs and nursing note reviewed.    Constitutional: General: She is not in acute distress. Appearance: She is well-developed. She is not diaphoretic. Comments: Well-appearing, nontoxic   HENT:      Head: Normocephalic and atraumatic. Eyes:      Conjunctiva/sclera: Conjunctivae normal.   Neck:      Musculoskeletal: Normal range of motion and neck supple. Cardiovascular:      Rate and Rhythm: Normal rate and regular rhythm. Heart sounds: Normal heart sounds. Pulmonary:      Effort: Pulmonary effort is normal. No respiratory distress. Breath sounds: Normal breath sounds. No stridor, decreased air movement or transmitted upper airway sounds. No decreased breath sounds or wheezing. Comments: Lung sounds are clear and equal bilaterally, no respiratory distress, 97% on room air  Chest:      Chest wall: No tenderness. Abdominal:      General: Bowel sounds are normal. There is no distension. Palpations: Abdomen is soft. Tenderness: There is no abdominal tenderness. There is no guarding or rebound. Musculoskeletal:         General: No deformity. Skin:     General: Skin is warm and dry. Neurological:      Mental Status: She is alert and oriented to person, place, and time. Deep Tendon Reflexes: Reflexes are normal and symmetric. Diagnostic Study Results     Labs -     Recent Results (from the past 12 hour(s))   SARS-COV-2    Collection Time: 01/28/21  1:29 PM   Result Value Ref Range    SARS-CoV-2 Please find results under separate order     COVID-19 RAPID TEST    Collection Time: 01/28/21  1:29 PM   Result Value Ref Range    Specimen source Nasopharyngeal      COVID-19 rapid test Not detected NOTD         Radiologic Studies -   No orders to display     CT Results  (Last 48 hours)    None        CXR Results  (Last 48 hours)    None            Medical Decision Making   I am the first provider for this patient.     I reviewed the vital signs, available nursing notes, past medical history, past surgical history, family history and social history. Vital Signs-Reviewed the patient's vital signs. Records Reviewed: Nursing Notes and Old Medical Records     Procedures: None   Procedures    Provider Notes (Medical Decision Making):     Differential Diagnosis:  influenza, mononucleosis, acute bronchitis, URI, streptococcal pharyngitis, pneumonia, asthma exacerbation, allergic rhinitis, seasonal allergies, COVID    Plan: Patient is well-appearing, satting 97% on room air no acute respiratory distress. We will plan to order rapid Covid swab.    3:23 PM  Patient's rapid Covid swab was negative however have advised patient to assume she is positive. Have discussed the importance of home isolation. Patient has remained 100% on room air no acute respiratory distress. Patient states she has plenty of albuterol at home and an emergency supply of prednisone should she need it. Have advised Tylenol Motrin as needed for body aches fevers or chills. Have advised hydration and rest.  Have given strict return precautions. Will discharge home. MED RECONCILIATION:  No current facility-administered medications for this encounter. Current Outpatient Medications   Medication Sig    budesonide-formoteroL (SYMBICORT) 160-4.5 mcg/actuation HFAA Take 2 Puffs by inhalation two (2) times a day.  albuterol (PROVENTIL VENTOLIN) 2.5 mg /3 mL (0.083 %) nebu Take 3 mL by inhalation every four (4) hours as needed for Wheezing.  VENTOLIN HFA 90 mcg/actuation inhaler inhale 2 puffs by mouth every 4 hours if needed for wheezing or shortness of breath    norethindrone-ethinyl estradiol (LOESTRIN FE 1/20, 28,) 1-20 mg-mcg per tablet Take  by mouth.  predniSONE (STERAPRED DS) 10 mg dose pack See administration instruction per 10mg dose pack    montelukast (SINGULAIR) 10 mg tablet take 1 tablet by mouth once daily    Nebulizer & Compressor machine For use q 6 hours as needed.   Indications: wheezing    Nebulizer Accessories kit For use q 6 hours as needed.  loratadine (CLARITIN) 10 mg tablet Take 10 mg by mouth.  EPINEPHrine (EPIPEN JR) 0.15 mg/0.3 mL (1:2,000) injection 0.15 mg by IntraMUSCular route once as needed. Disposition:  Home     DISCHARGE NOTE:   Pt has been reexamined. Patient has no new complaints, changes, or physical findings. Care plan outlined and precautions discussed. Results of workup were reviewed with the patient. All medications were reviewed with the patient. All of pt's questions and concerns were addressed. Patient was instructed and agrees to follow up with PCP as well as to return to the ED upon further deterioration. Patient is ready to go home. Follow-up Information     Follow up With Specialties Details Why [facility] EMERGENCY DEPT Emergency Medicine  As needed [address] [facility]  [phone]    Irlanda Goodson MD Internal Medicine Schedule an appointment as soon as possible for a visit   [address] [place] [address]  [phone]            Current Discharge Medication List              Diagnosis     Clinical Impression:   1. Lab test negative for COVID-19 virus          \"Please note that this dictation was completed with [employer], the [employer] voice recognition software. Quite often unanticipated grammatical, syntax, homophones, and other interpretive errors are inadvertently transcribed by the computer software. Please disregard these errors. Please excuse any errors that have escaped final proofreading. \"

## 2021-01-28 NOTE — ED NOTES
Patient is AXOX4, pt states recent exposure to covid. Pt states sore throat 2 days ago, states feeling fine the next day and then headache started the following day. Patient has headache now. Patient is resting on stretcher.

## 2021-01-28 NOTE — ED NOTES
Patient states she does not want to finish fluid bolus. Patient explained discharge instructions and states no questions.

## 2021-01-28 NOTE — ED NOTES
Patient medicated and resting. No needs at this time. Instructed to let me know is she needs anything.

## 2021-10-25 ENCOUNTER — DOCUMENTATION ONLY (OUTPATIENT)
Dept: PULMONOLOGY | Age: 26
End: 2021-10-25

## 2021-11-10 ENCOUNTER — TRANSCRIBE ORDER (OUTPATIENT)
Dept: SCHEDULING | Age: 26
End: 2021-11-10

## 2021-11-10 DIAGNOSIS — R00.2 PALPITATIONS: ICD-10-CM

## 2021-11-10 DIAGNOSIS — R06.00 DYSPNEA: ICD-10-CM

## 2021-11-10 DIAGNOSIS — R00.0 TACHYCARDIA: Primary | ICD-10-CM

## 2021-11-17 ENCOUNTER — HOSPITAL ENCOUNTER (OUTPATIENT)
Dept: NON INVASIVE DIAGNOSTICS | Age: 26
Discharge: HOME | End: 2021-11-17
Attending: INTERNAL MEDICINE
Payer: COMMERCIAL

## 2021-11-17 VITALS
HEIGHT: 62 IN | BODY MASS INDEX: 29.08 KG/M2 | SYSTOLIC BLOOD PRESSURE: 125 MMHG | WEIGHT: 158 LBS | DIASTOLIC BLOOD PRESSURE: 79 MMHG

## 2021-11-17 DIAGNOSIS — R00.0 TACHYCARDIA: ICD-10-CM

## 2021-11-17 DIAGNOSIS — R06.00 DYSPNEA: ICD-10-CM

## 2021-11-17 DIAGNOSIS — R00.2 PALPITATIONS: ICD-10-CM

## 2021-11-17 LAB
ECHO AO ROOT DIAM: 2.9 CM
ECHO LA AREA 4C: 12.99 CM2
ECHO LA VOL 2C: 23.17 ML (ref 22–52)
ECHO LA VOL 4C: 34.83 ML (ref 22–52)
ECHO LA VOL BP: 36.8 ML (ref 22–52)
ECHO LA VOL/BSA BIPLANE: 21.27 ML/M2 (ref 16–28)
ECHO LA VOLUME INDEX A2C: 13.39 ML/M2 (ref 16–28)
ECHO LA VOLUME INDEX A4C: 20.13 ML/M2 (ref 16–28)
ECHO LV INTERNAL DIMENSION DIASTOLIC: 4.2 CM (ref 3.9–5.3)
ECHO LV INTERNAL DIMENSION SYSTOLIC: 2.79 CM
ECHO LV IVSD: 1.06 CM (ref 0.6–0.9)
ECHO LV MASS 2D: 148 G (ref 67–162)
ECHO LV MASS INDEX 2D: 85.5 G/M2 (ref 43–95)
ECHO LV POSTERIOR WALL DIASTOLIC: 1.05 CM (ref 0.6–0.9)
ECHO LVOT DIAM: 1.86 CM
ECHO LVOT PEAK GRADIENT: 4.07 MMHG
ECHO LVOT PEAK VELOCITY: 100.86 CM/S
ECHO LVOT SV: 45.4 ML
ECHO LVOT VTI: 16.8 CM
ECHO MV A VELOCITY: 63.5 CM/S
ECHO MV E DECELERATION TIME (DT): 143.15 MS
ECHO MV E VELOCITY: 92.83 CM/S
ECHO MV E/A RATIO: 1.46
ECHO TV REGURGITANT MAX VELOCITY: 153.9 CM/S
ECHO TV REGURGITANT PEAK GRADIENT: 9.47 MMHG
LVOT MG: 2.1 MMHG

## 2021-11-17 PROCEDURE — 93306 TTE W/DOPPLER COMPLETE: CPT

## 2021-11-19 ENCOUNTER — OFFICE VISIT (OUTPATIENT)
Dept: PULMONOLOGY | Age: 26
End: 2021-11-19
Payer: COMMERCIAL

## 2021-11-19 VITALS
RESPIRATION RATE: 16 BRPM | WEIGHT: 162.6 LBS | BODY MASS INDEX: 29.92 KG/M2 | TEMPERATURE: 98.2 F | HEIGHT: 62 IN | SYSTOLIC BLOOD PRESSURE: 116 MMHG | OXYGEN SATURATION: 98 % | DIASTOLIC BLOOD PRESSURE: 75 MMHG | HEART RATE: 96 BPM

## 2021-11-19 DIAGNOSIS — J45.40 MODERATE PERSISTENT ASTHMA WITHOUT COMPLICATION: Primary | ICD-10-CM

## 2021-11-19 DIAGNOSIS — Z23 NEEDS FLU SHOT: ICD-10-CM

## 2021-11-19 DIAGNOSIS — R00.0 TACHYCARDIA WITH HEART RATE 121-140 BEATS PER MINUTE: ICD-10-CM

## 2021-11-19 DIAGNOSIS — R06.02 SOB (SHORTNESS OF BREATH): ICD-10-CM

## 2021-11-19 PROCEDURE — 99204 OFFICE O/P NEW MOD 45 MIN: CPT | Performed by: INTERNAL MEDICINE

## 2021-11-19 PROCEDURE — 90686 IIV4 VACC NO PRSV 0.5 ML IM: CPT | Performed by: INTERNAL MEDICINE

## 2021-11-19 PROCEDURE — 90471 IMMUNIZATION ADMIN: CPT | Performed by: INTERNAL MEDICINE

## 2021-11-19 RX ORDER — THYROID, PORCINE 15 MG/1
15 TABLET ORAL DAILY
COMMUNITY
Start: 2021-10-29 | End: 2022-04-22 | Stop reason: ALTCHOICE

## 2021-11-19 RX ORDER — FLUTICASONE FUROATE AND VILANTEROL 100; 25 UG/1; UG/1
POWDER RESPIRATORY (INHALATION)
COMMUNITY

## 2021-11-19 NOTE — LETTER
11/19/2021    Patient: Maribel Terrazas   YOB: 1995   Date of Visit: 11/19/2021     Candace Dawson MD  [facility]  [address] [place], [place] [address] [address] [address]  Via Fax: [phone]    Dear Candace Dawson MD,      Thank you for referring Ms. Rm Gallardo to [address] [place] [address] for evaluation. My notes for this consultation are attached. If you have questions, please do not hesitate to call me. I look forward to following your patient along with you.       Sincerely,    Santhosh Valente MD

## 2021-11-19 NOTE — PATIENT INSTRUCTIONS
Managing Your Allergies: Care Instructions  Your Care Instructions     Managing your allergies is an important part of staying healthy. Your doctor will help you find out what may be the cause of the allergies. Common causes of symptoms are house dust and dust mites, animal dander, mold, and pollen. As soon as you know what triggers your symptoms, try to avoid those things. This can help prevent allergy symptoms, asthma, and other health problems. Ask your doctor about allergy medicine or immunotherapy. These treatments may help reduce or prevent allergy symptoms. Follow-up care is a key part of your treatment and safety. Be sure to make and go to all appointments, and call your doctor if you are having problems. It's also a good idea to know your test results and keep a list of the medicines you take. How can you care for yourself at home? · If you have been told by your doctor that dust or dust mites are causing your allergy, decrease the dust around your bed:  ? Wash sheets, pillowcases, and other bedding in hot water every week. ? Use dust-proof covers for pillows, duvets, and mattresses. Avoid plastic covers because they tear easily and do not \"breathe. \" Wash as instructed on the label. ? Do not use any blankets and pillows that you do not need. ? Use blankets that you can wash in your washing machine. ? Consider removing drapes and carpets, which attract and hold dust, from your bedroom. · If you are allergic to house dust and mites, do not use home humidifiers. Your doctor can suggest ways you can control dust and mites. · Look for signs of cockroaches. Cockroaches cause allergic reactions. Use cockroach baits to get rid of them. Then, clean your home well. Cockroaches like areas where grocery bags, newspapers, empty bottles, or cardboard boxes are stored. Do not keep these inside your home, and keep trash and food containers sealed.  Seal off any spots where cockroaches might enter your home.  · If you are allergic to mold, get rid of furniture, rugs, and drapes that smell musty. Check for mold in the bathroom. · If you are allergic to outdoor pollen or mold spores, use air-conditioning. Change or clean all filters every month. Keep windows closed. · If you are allergic to pollen, stay inside when pollen counts are high. Use a vacuum  with a HEPA filter or a double-thickness filter at least two times each week. · Stay inside when air pollution is bad. Avoid paint fumes, perfumes, and other strong odors. · Avoid conditions that make your allergies worse. Stay away from smoke. Do not smoke or let anyone else smoke in your house. Do not use fireplaces or wood-burning stoves. · If you are allergic to your pets, change the air filter in your furnace every month. Use high-efficiency filters. · If you are allergic to pet dander, keep pets outside or out of your bedroom. Old carpet and cloth furniture can hold a lot of animal dander. You may need to replace them. When should you call for help? Give an epinephrine shot if:    · You think you are having a severe allergic reaction. After giving an epinephrine shot call 911, even if you feel better. Call 911 if:    · You have symptoms of a severe allergic reaction. These may include:  ? Sudden raised, red areas (hives) all over your body. ? Swelling of the throat, mouth, lips, or tongue. ? Trouble breathing. ? Passing out (losing consciousness). Or you may feel very lightheaded or suddenly feel weak, confused, or restless.     · You have been given an epinephrine shot, even if you feel better. Call your doctor now or seek immediate medical care if:    · You have symptoms of an allergic reaction, such as:  ? A rash or hives (raised, red areas on the skin). ? Itching. ? Swelling. ? Belly pain, nausea, or vomiting.    Watch closely for changes in your health, and be sure to contact your doctor if:    · Your allergies get worse.     · You need help controlling your allergies.     · You have questions about allergy testing.     · You do not get better as expected. Where can you learn more? Go to http://www.gray.com/  Enter L249 in the search box to learn more about \"Managing Your Allergies: Care Instructions. \"  Current as of: February 10, 2021               Content Version: 13.0  © 9761-4294 [employer]. Care instructions adapted under license by [employer] (which disclaims liability or warranty for this information). If you have questions about a medical condition or this instruction, always ask your healthcare professional. [address] any warranty or liability for your use of this information. Asthma in Adults: Care Instructions  Overview     Asthma makes it hard for you to breathe. During an asthma attack, the airways swell and narrow. Severe asthma attacks can be dangerous, but you can usually prevent them. Controlling asthma and treating symptoms before they get bad can help you avoid bad attacks. You may also avoid future trips to the doctor. Follow-up care is a key part of your treatment and safety. Be sure to make and go to all appointments, and call your doctor if you are having problems. It's also a good idea to know your test results and keep a list of the medicines you take. How can you care for yourself at home? · Follow your asthma action plan so you can manage your symptoms at home. An asthma action plan will help you prevent and control airway reactions and will tell you what to do during an asthma attack. If you do not have an asthma action plan, work with your doctor to build one. · Take your asthma medicine exactly as prescribed. Medicine plays an important role in controlling asthma. Talk to your doctor right away if you have any questions about what to take and how to take it. ?  Use your quick-relief medicine when you have symptoms of an attack. Quick-relief medicine often is an albuterol inhaler. Some people need to use quick-relief medicine before they exercise. ? Take your controller medicine every day, not just when you have symptoms. Controller medicine is usually an inhaled corticosteroid. The goal is to prevent problems before they occur. Do not use your controller medicine to try to treat an attack that has already started. It does not work fast enough to help. ? If your doctor prescribed corticosteroid pills to use during an attack, take them as directed. They may take hours to work, but they may shorten the attack and help you breathe better. ? Keep your quick-relief medicine with you at all times. · Talk to your doctor before using other medicines. Some medicines, such as aspirin, can cause asthma attacks in some people. · Check yourself for asthma symptoms to know which step to follow in your action plan. Watch for things like being short of breath, having chest tightness, coughing, and wheezing. Also notice if symptoms wake you up at night or if you get tired quickly when you exercise. · If you have a peak flow meter, use it to check how well you are breathing. This can help you predict when an asthma attack is going to occur. Then you can take medicine to prevent the asthma attack or make it less severe. · See your doctor regularly. These visits will help you learn more about asthma and what you can do to control it. Your doctor will monitor your treatment to make sure the medicine is helping you. · Keep track of your asthma attacks and your treatment. After you have had an attack, write down what triggered it, what helped end it, and any concerns you have about your asthma action plan. Take your diary when you see your doctor. You can then review your asthma action plan and decide if it is working. · Do not smoke or allow others to smoke around you. Avoid smoky places. Smoking makes asthma worse.  If you need help quitting, talk to your doctor about stop-smoking programs and medicines. These can increase your chances of quitting for good. · Learn what triggers an asthma attack for you, and avoid the triggers when you can. Common triggers include colds, smoke, air pollution, dust, pollen, mold, pets, cockroaches, stress, and cold air. · Avoid colds and the flu. Get a pneumococcal vaccine shot. If you have had one before, ask your doctor whether you need a second dose. Get a flu vaccine every fall. If you must be around people with colds or the flu, wash your hands often. When should you call for help? Call 911 anytime you think you may need emergency care. For example, call if:    · You have severe trouble breathing. Call your doctor now or seek immediate medical care if:    · Your symptoms do not get better after you have followed your asthma action plan.     · You cough up yellow, dark brown, or bloody mucus (sputum). Watch closely for changes in your health, and be sure to contact your doctor if:    · Your coughing and wheezing get worse.     · You need to use quick-relief medicine on more than 2 days a week (unless it is just for exercise).     · You need help figuring out what is triggering your asthma attacks. Where can you learn more? Go to http://www.gray.com/  Enter P597 in the search box to learn more about \"Asthma in Adults: Care Instructions. \"  Current as of: February 24, 2020               Content Version: 12.7  © 2990-1189 [employer]. Care instructions adapted under license by [employer] (which disclaims liability or warranty for this information). If you have questions about a medical condition or this instruction, always ask your healthcare professional. [place] [address] any warranty or liability for your use of this information. Vaccine Information Statement    Influenza (Flu) Vaccine (Inactivated or Recombinant):  What You Need to Know    Many vaccine information statements are available in Ecuadorean and other languages. See www.immunize.org/vis. Hojas de información sobre vacunas están disponibles en español y en muchos otros idiomas. Visite www.immunize.org/vis. 1. Why get vaccinated? Influenza vaccine can prevent influenza (flu). Flu is a contagious disease that spreads around the [facility] every year, usually between October and May. Anyone can get the flu, but it is more dangerous for some people. Infants and young children, people 72 years and older, pregnant people, and people with certain health conditions or a weakened immune system are at greatest risk of flu complications. Pneumonia, bronchitis, sinus infections, and ear infections are examples of flu-related complications. If you have a medical condition, such as heart disease, cancer, or diabetes, flu can make it worse. Flu can cause fever and chills, sore throat, muscle aches, fatigue, cough, headache, and runny or stuffy nose. Some people may have vomiting and diarrhea, though this is more common in children than adults. In an average year, thousands of people in the [facility] die from flu, and many more are hospitalized. Flu vaccine prevents millions of illnesses and flu-related visits to the doctor each year. 2. Influenza vaccines     CDC recommends everyone 6 months and older get vaccinated every flu season. Children 6 months through 6years of age may need 2 doses during a single flu season. Everyone else needs only 1 dose each flu season. It takes about 2 weeks for protection to develop after vaccination. There are many flu viruses, and they are always changing. Each year a new flu vaccine is made to protect against the influenza viruses believed to be likely to cause disease in the upcoming flu season. Even when the vaccine doesnt exactly match these viruses, it may still provide some protection.      Influenza vaccine does not cause flu.    Influenza vaccine may be given at the same time as other vaccines. 3. Talk with your health care provider    Tell your vaccination provider if the person getting the vaccine:   Has had an allergic reaction after a previous dose of influenza vaccine, or has any severe, life-threatening allergies    Has ever had Guillain-Barré Syndrome (also called GBS)    In some cases, your health care provider may decide to postpone influenza vaccination until a future visit. Influenza vaccine can be administered at any time during pregnancy. People who are or will be pregnant during influenza season should receive inactivated influenza vaccine. People with minor illnesses, such as a cold, may be vaccinated. People who are moderately or severely ill should usually wait until they recover before getting influenza vaccine. Your health care provider can give you more information. 4. Risks of a vaccine reaction     Soreness, redness, and swelling where the shot is given, fever, muscle aches, and headache can happen after influenza vaccination.  There may be a very small increased risk of Guillain-Barré Syndrome (GBS) after inactivated influenza vaccine (the flu shot). Evan Carrillo children who get the flu shot along with pneumococcal vaccine (PCV13) and/or DTaP vaccine at the same time might be slightly more likely to have a seizure caused by fever. Tell your health care provider if a child who is getting flu vaccine has ever had a seizure. People sometimes faint after medical procedures, including vaccination. Tell your provider if you feel dizzy or have vision changes or ringing in the ears. As with any medicine, there is a very remote chance of a vaccine causing a severe allergic reaction, other serious injury, or death. 5. What if there is a serious problem? An allergic reaction could occur after the vaccinated person leaves the clinic.  If you see signs of a severe allergic reaction (hives, swelling of the face and throat, difficulty breathing, a fast heartbeat, dizziness, or weakness), call 9-1-1 and get the person to the nearest hospital.    For other signs that concern you, call your health care provider. Adverse reactions should be reported to the Vaccine Adverse Event Reporting System (VAERS). Your health care provider will usually file this report, or you can do it yourself. Visit the VAERS website at www.vaers. [facility].gov or call 4-714.321.6119. VAERS is only for reporting reactions, and VAERS staff members do not give medical advice. 6. The National Vaccine Injury Compensation Program    The [facility] Vaccine Injury Compensation Program (VICP) is a federal program that was created to compensate people who may have been injured by certain vaccines. Claims regarding alleged injury or death due to vaccination have a time limit for filing, which may be as short as two years. Visit the VICP website at www.[facility].gov/vaccinecompensation or call 8-358.938.9589 to learn about the program and about filing a claim. 7. How can I learn more?  Ask your health care provider.  Call your local or state health department.  Visit the website of the Food and Drug Administration (FDA) for vaccine package inserts and additional information at www.fda.gov/vaccines-blood-biologics/vaccines.  Contact the Centers for Disease Control and Prevention (CDC):  - Call 1-437.599.4551 (1-800-CDC-INFO) or  - Visit CDCs influenza website at www.cdc.gov/flu. Vaccine Information Statement   Inactivated Influenza Vaccine   8/6/2021  42 RAND Mercer [id number]   Department of Health and Human Services  Centers for Disease Control and Prevention    Office Use Only

## 2021-11-19 NOTE — PROGRESS NOTES
[facility]  Pulmonary, Critical Care, and Sleep Medicine      Pulmonary Office Initial referral report    Name: Rama Duncan     : 1995     Date: 2021        Subjective:   Patient has been referred for evaluation of: Asthma    Patient is a 31 y.o. female has been diagnosed with asthma since early childhood. Over the years patient has needed treatment with inhalers with some seasonal worsening and nocturnal worsening needing increasing rescue medications. She has not had any major hospitalizations related to asthma. In  she had allergy test done-[facility] in [address] [place] [address] Dr. Domi Hamilton and was started on allergen immunotherapy. Patient had to stop taking the shots due to Covid in 2020. Subsequently she lost her job in 2020 and after she got a new job she did not restart the allergen immunotherapy due to very high insurance co-pay  She states that her asthma has always been fairly well controlled but does have off and on. Nataly Keto works best for her. She uses rescue inhaler more frequently when she has exacerbations. In 2021 patient noticed increasing symptoms of shortness of breath which have been very bothersome. Patient increased her rescue inhalers and also completed taper of prednisone but did not improve much. In the same time she was diagnosed with hypothyroidism and started on levothyroxine. Further work-up revealed patient with Ty Carrero  Later she noticed that her heart rate was staying consistently high as high as 140. At this point it was thought that the levothyroxine may have something to do with her tachycardia-we will switch back to [place] Thyroid and she is doing better both with her heart rate as well as symptoms of shortness of breath. She had an EKG and an echocardiogram completed as part of her work-up-no additional findings noted.   Patient does admit to having chronic nasal symptoms and sinus congestion. She had previously lived in an apartment where she thought there was [place]unch has now moved out. She has a cat at home and states that she has not been allergic to cats  She has some exercise intolerance related to shortness of breath and asthma  She has been a never smoker. No history of vaping. No history of using electronic cigarettes  Associated wheezing, chest pain, fever, chills, night sweats dyspepsia, reflux. Patient has been on birth control pill since December 2020  Occupational exposure-none to any inhalation dust, chemicals or fumes. 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 Testing:  CXR  PFT  Echo  IgE level-46  Vaccinations:  Pneumococcal  Influenza    DME:  Nebulizer    Past Medical History:   Diagnosis Date    Moderate persistent asthma     also exercise-induced     No past surgical history on file. Social History     Socioeconomic History    Marital status: SINGLE   Tobacco Use    Smoking status: Never Smoker    Smokeless tobacco: Never Used   Substance and Sexual Activity    Alcohol use: Yes     Comment: occ    Drug use: No    Sexual activity: Yes     Partners: Male     Birth control/protection: Pill, Condom       No family history on file. No family history of asthma    Allergies   Allergen Reactions    Latex Anaphylaxis and Rash    Banana Anaphylaxis    Shellfish Derived Anaphylaxis   .   Current Outpatient Medications   Medication Sig Dispense Refill    albuterol (PROVENTIL VENTOLIN) 2.5 mg /3 mL (0.083 %) nebu Take 3 mL by inhalation every four (4) hours as needed for Wheezing. 75 mL 1    VENTOLIN HFA 90 mcg/actuation inhaler inhale 2 puffs by mouth every 4 hours if needed for wheezing or shortness of breath 54 g 3    Nebulizer & Compressor machine For use q 6 hours as needed. Indications: wheezing 1 Each 0    Nebulizer Accessories kit For use q 6 hours as needed. 1 Kit 0    loratadine (CLARITIN) 10 mg tablet Take 10 mg by mouth.  norethindrone-ethinyl estradiol (LOESTRIN FE 1/20, 28,) 1-20 mg-mcg per tablet Take  by mouth.  [place] Thyroid 15 mg tablet Take 15 mg by mouth daily.  fluticasone furoate-vilanteroL (BREO ELLIPTA) 100-25 mcg/dose inhaler Breo Ellipta 100 mcg-25 mcg/dose powder for inhalation      predniSONE (STERAPRED DS) 10 mg dose pack See administration instruction per 10mg dose pack (Patient not taking: Reported on 11/19/2021) 21 Tab 0    EPINEPHrine (EPIPEN JR) 0.15 mg/0.3 mL (1:2,000) injection 0.15 mg by IntraMUSCular route once as needed.  (Patient not taking: Reported on 11/19/2021)       Review of Systems:  HEENT: No epistaxis, +nasal drainage, no difficulty in swallowing, no redness in eyes  Respiratory: as above  Cardiovascular: no chest pain, no palpitations, no chronic leg edema, no syncope  Gastrointestinal: no abd pain, no vomiting, no diarrhea, no bleeding symptoms  Genitourinary: No urinary symptoms or hematuria  Integument/breast: No ulcers or rashes  Musculoskeletal:Neg  Neurological: No focal weakness, no seizures, no headaches  Behvioral/Psych: No anxiety, no depression  Constitutional: No fever, no chills, no weight loss, no night sweats     Objective:     Visit Vitals  /75 (BP 1 Location: Left upper arm, BP Patient Position: Sitting, BP Cuff Size: Large adult)   Pulse 96   Temp 98.2 °F (36.8 °C) (Oral)   Resp 16   Ht 5' 2\" (1.575 m)   Wt 73.8 kg (162 lb 9.6 oz)   SpO2 98% Comment: RA Rest   BMI 29.74 kg/m²        Physical Exam:   General: comfortable, no acute distress  HEENT: pupils reactive, sclera anicteric, EOM intact  Neck: No adenopathy or thyroid swelling, no lymphadenopathy or JVD, supple  CVS: S1S2 no murmurs  RS: Mod AE bilaterally, no tactile fremitus or egophony, no accessory muscle use  Abd: soft, non tender, no hepatosplenomegaly  Neuro: non focal, awake, alert  Extrm: no leg edema, clubbing or cyanosis  Skin: no rash    Data review:   Pertinent labs: CBC, BMP    IgE: 46  FeNO-29  PFT:    Date FVC FEV1  FEV1/FVC YKH92-29 TLC RV RV/TLC VC DLCO   8/16/2014  normal  70  decreased  46   increased  increased     1/18/2020  83  79  normal  64                                  6 min walk test; not indicated      No results found for this or any previous visit. Imaging:  I have personally reviewed the patients radiographs and have reviewed the reports:  XR Results (most recent):  No results found for this or any previous visit. CT Results (most recent):  No results found for this or any previous visit. .     Patient Active Problem List   Diagnosis Code    Moderate persistent asthma J45.40    Allergic rhinitis J30.9     IMPRESSION:   · Asthma-moderate persistent well-controlled combination LABA plus ICS in the form of Breo. Patient likely has significant atopic component-type II and would benefit from evaluation for control of allergens and consideration for biologic agents to better control baseline pathophysiology and long-term exposure to corticosteroids and lessen airway remodeling. PFTs so far have shown preserved pulmonary function. Patient previously had been on allergen immunotherapy which is also an option for added treatment. Current setback in control more likely related to additional environmental allergen exposures and setback in trigger control.   · Shortness of breath associated with tachycardia and some palpitations are more likely to be related with her thyroid treatment and less likely to be associated due to cardiopulmonary condition per se. Patient is on birth control pills and therefore cannot completely rule out VTE. Echocardiogram with normal LV function and no report of right heart strain  · History of Hashimoto thyroiditis  · Chronic rhinitis-likely allergic      RECOMMENDATIONS:   · Discussed with patient current condition pathophysiology and need for interventions. · Will obtain results of previously completed allergy testing from her allergist's clinic and order a new IgE level and consider adding Biologics if qualifies  · Continue with Breo  · May need additional control of rhinitis-nasal agents  · Environmental control including minimizing trigger exposures. Patient is obviously very fond of her cat and at this point not willing to eliminate the cat from her environment  · Continue monitoring heart rate closely after change in treatment to [place] Thyroid  · Will order D-dimer if elevated pursue work-up for VTE  · Questions concerns addressed  · We will follow-up after completion of further work-up and make further recommendations     Health maintenance screens deferred to Primary care provider. Humble Newton MD    This patient has a high complexity chronic care condition   This Visit needed Moderate/High complexity medically necessary decision making and management plans. Please note that this dictation was completed with [employer], the computer voice recognition software. Quite often unanticipated grammatical, syntax, homophones, and other interpretive errors are inadvertently transcribed by the computer software. Please disregard these errors. Please excuse any errors that have escaped final proofreading.

## 2021-11-19 NOTE — PROGRESS NOTES
Reza Sofia presents today for   Chief Complaint   Patient presents with    Wheezing    Results     Echo 11/17/21       Is someone accompanying this pt? No    Is the patient using any DME equipment during OV? No    -DME Company NA    Depression Screening:  3 most recent PHQ Screens 3/31/2020   Little interest or pleasure in doing things Not at all   Feeling down, depressed, irritable, or hopeless Not at all   Total Score PHQ 2 0       Learning Assessment:  Learning Assessment 8/11/2014   PRIMARY LEARNER Patient   HIGHEST LEVEL OF EDUCATION - PRIMARY LEARNER  SOME COLLEGE   BARRIERS PRIMARY LEARNER NONE   CO-LEARNER CAREGIVER No   PRIMARY LANGUAGE ENGLISH   LEARNER PREFERENCE PRIMARY DEMONSTRATION   ANSWERED BY patient   RELATIONSHIP SELF       Abuse Screening:  Abuse Screening Questionnaire 3/31/2020   Do you ever feel afraid of your partner? N   Are you in a relationship with someone who physically or mentally threatens you? N   Is it safe for you to go home? Y       Fall Risk  Fall Risk Assessment, last 12 mths 3/31/2020   Able to walk? Yes   Fall in past 12 months? No         Coordination of Care:  1. Have you been to the ER, urgent care clinic since your last visit? Hospitalized since your last visit? No    2. Have you seen or consulted any other health care providers outside of the [address] [place] [address] since your last visit? Include any pap smears or colon screening.  Dr Irsrael Heard PCP

## 2021-11-22 NOTE — PROGRESS NOTES
Nat Caicedo is a 31 y.o. female who presents for routine immunizations. She denies any symptoms , reactions or allergies that would exclude them from being immunized today. Risks and adverse reactions were discussed and the VIS was given to them. All questions were addressed. She was observed for 10 min post injection. There were no reactions observed.     Michele Messina LPN

## 2021-11-24 ENCOUNTER — HOSPITAL ENCOUNTER (OUTPATIENT)
Dept: LAB | Age: 26
Discharge: HOME | End: 2021-11-24
Payer: COMMERCIAL

## 2021-11-24 DIAGNOSIS — R00.0 TACHYCARDIA WITH HEART RATE 121-140 BEATS PER MINUTE: ICD-10-CM

## 2021-11-24 DIAGNOSIS — R06.02 SOB (SHORTNESS OF BREATH): ICD-10-CM

## 2021-11-24 DIAGNOSIS — J45.40 MODERATE PERSISTENT ASTHMA WITHOUT COMPLICATION: ICD-10-CM

## 2021-11-24 LAB — D DIMER PPP FEU-MCNC: 0.4 UG/ML(FEU)

## 2021-11-24 PROCEDURE — 82785 ASSAY OF IGE: CPT

## 2021-11-24 PROCEDURE — 85379 FIBRIN DEGRADATION QUANT: CPT

## 2021-11-29 LAB — IGE SERPL-ACNC: 42 IU/ML (ref 6–495)

## 2022-01-17 ENCOUNTER — DOCUMENTATION ONLY (OUTPATIENT)
Dept: PULMONOLOGY | Age: 27
End: 2022-01-17

## 2022-01-17 NOTE — PROGRESS NOTES
Pt cancelled appt w/VAP for 1/20/22 d/t having an ablasion done. She also stated that SVT is what was causing her short of breath.

## 2022-02-28 ENCOUNTER — TELEPHONE (OUTPATIENT)
Dept: PULMONOLOGY | Age: 27
End: 2022-02-28

## 2022-02-28 NOTE — TELEPHONE ENCOUNTER
It is okay to take propranolol-not completely contraindicated since not everybody develops more bronchospasm. If start getting more asthma problems can talk to cardiologist about changing to more selective beta-blocker agents like Lopressor which will be better tolerated with asthma. In summary-okay to take propranolol, monitor for any setback.

## 2022-02-28 NOTE — TELEPHONE ENCOUNTER
Bridget Bloom MD 3 days ago       John Gallegos. My cardiologist just sent in an RX for propranolol 10mg twice a day. From what Abdon read, it looks like it is contraindicated with asthma. I wanted to get a second opinion on if you feel this is safe for me to take?

## 2022-04-22 ENCOUNTER — OFFICE VISIT (OUTPATIENT)
Dept: PULMONOLOGY | Age: 27
End: 2022-04-22
Payer: COMMERCIAL

## 2022-04-22 VITALS
BODY MASS INDEX: 30.73 KG/M2 | OXYGEN SATURATION: 98 % | HEIGHT: 62 IN | DIASTOLIC BLOOD PRESSURE: 76 MMHG | RESPIRATION RATE: 18 BRPM | HEART RATE: 96 BPM | TEMPERATURE: 98 F | SYSTOLIC BLOOD PRESSURE: 110 MMHG | WEIGHT: 167 LBS

## 2022-04-22 DIAGNOSIS — J45.40 MODERATE PERSISTENT ASTHMA WITHOUT COMPLICATION: Primary | ICD-10-CM

## 2022-04-22 DIAGNOSIS — R06.83 SNORING: ICD-10-CM

## 2022-04-22 DIAGNOSIS — J30.81 ALLERGIC RHINITIS DUE TO ANIMAL HAIR AND DANDER: ICD-10-CM

## 2022-04-22 DIAGNOSIS — I47.1 SVT (SUPRAVENTRICULAR TACHYCARDIA) (HCC): ICD-10-CM

## 2022-04-22 DIAGNOSIS — U09.9 POST-COVID-19 CONDITION: ICD-10-CM

## 2022-04-22 PROCEDURE — 99214 OFFICE O/P EST MOD 30 MIN: CPT | Performed by: INTERNAL MEDICINE

## 2022-04-22 RX ORDER — LEVOTHYROXINE SODIUM 50 UG/1
TABLET ORAL
COMMUNITY

## 2022-04-22 RX ORDER — METOPROLOL SUCCINATE 25 MG/1
25 TABLET, EXTENDED RELEASE ORAL DAILY
COMMUNITY

## 2022-04-22 NOTE — LETTER
4/22/2022    Patient: Joselyn Padilla   YOB: 1995   Date of Visit: 4/22/2022     Nimesh Cleary MD  [address] [place] [id number]  Via Fax: [phone]    Dear Nimesh Cleary MD,      Thank you for referring Ms. Lawrence Sampson to [facility] [place] for evaluation. My notes for this consultation are attached. If you have questions, please do not hesitate to call me. I look forward to following your patient along with you.       Sincerely,    Deneen Palma MD

## 2022-04-22 NOTE — PROGRESS NOTES
[facility]  Pulmonary, Critical Care, and Sleep Medicine      Pulmonary Office follow up    Name: Jose Carlos Dumont     : 1995     Date: 2022        Subjective:   Patient has been referred for evaluation of: Asthma, persistent symptoms of shortness of breath    22  Since her last visit patient has had several interval problems. She continues to have significant symptoms of shortness of breath with palpitations and also was diagnosed with COVID-19 in 2022. 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 She underwent ablation procedure on 2/15/2022 with successful slow pathway ablation. Subsequently she was put on beta-blockers  Patient states that she was having daily symptoms of continued shortness of breath however over the past 2 to 3 weeks she is beginning to feel better and now only has episodic episodes where she gets a little short of breath-described as breath being cut off and smothering. Ventolin does not help when she gets that and she has noticed that hunching over and taking deep breaths does help. She has been on metoprolol for about a month. Also her thyroid medications were changed from [place] Thyroid to levothyroxine and since February she is on 50 mcg of Synthroid. Patient has also resumed allergen immunotherapy with her allergist-receiving weekly shots of 3 different immunotherapy  Also had an echocardiogram done on 2022. She brought in reports of the allergy testing, spirometry's and notes from cardiology. Patient also mentions that she had COVID in 2021 as well  Complains of difficulty sleeping with snoring  Feels fatigued in the daytime and is concerned she may have sleep apnea    HPI  Patient is a 31 y.o. female has been diagnosed with asthma since early childhood.   Over the years patient has needed treatment with inhalers with some seasonal worsening and nocturnal worsening needing increasing rescue medications. She has not had any major hospitalizations related to asthma. In 2019 she had allergy test done-[facility] in [place] Dr. Jovana Lim and was started on allergen immunotherapy. Patient had to stop taking the shots due to Covid in March 2020. Subsequently she lost her job in June 2020 and after she got a new job she did not restart the allergen immunotherapy due to very high insurance co-pay  She states that her asthma has always been fairly well controlled but does have off and on. Cory Ricardo works best for her. She uses rescue inhaler more frequently when she has exacerbations. In August 2021 patient noticed increasing symptoms of shortness of breath which have been very bothersome. Patient increased her rescue inhalers and also completed taper of prednisone but did not improve much. In the same time she was diagnosed with hypothyroidism and started on levothyroxine. Further work-up revealed patient with Ke Kendrick  Later she noticed that her heart rate was staying consistently high as high as 140. At this point it was thought that the levothyroxine may have something to do with her tachycardia-we will switch back to [place] Thyroid and she is doing better both with her heart rate as well as symptoms of shortness of breath. She had an EKG and an echocardiogram completed as part of her work-up-no additional findings noted. Patient does admit to having chronic nasal symptoms and sinus congestion. She had previously lived in an apartment where she thought there was Warden Cabreramin has now moved out. She has a cat at home and states that she has not been allergic to cats  She has some exercise intolerance related to shortness of breath and asthma  She has been a never smoker. No history of vaping. No history of using electronic cigarettes  Associated wheezing, chest pain, fever, chills, night sweats dyspepsia, reflux.   Patient has been on birth control pill since December 2020  Occupational exposure-none to any inhalation dust, chemicals or fumes. 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 Testing:  CXR  PFT  Echo  IgE level-46  Vaccinations:  Pneumococcal  Influenza    DME:  Nebulizer    Past Medical History:   Diagnosis Date    Moderate persistent asthma     also exercise-induced     History reviewed. No pertinent surgical history. History reviewed. No pertinent family history. No family history of asthma    Allergies   Allergen Reactions    Latex Anaphylaxis and Rash    Banana Anaphylaxis    Shellfish Derived Anaphylaxis   . Current Outpatient Medications   Medication Sig Dispense Refill    levothyroxine (SYNTHROID) 50 mcg tablet Take  by mouth Daily (before breakfast).  metoprolol succinate (TOPROL-XL) 25 mg XL tablet Take 25 mg by mouth daily.  fluticasone furoate-vilanteroL (BREO ELLIPTA) 100-25 mcg/dose inhaler Breo Ellipta 100 mcg-25 mcg/dose powder for inhalation      albuterol (PROVENTIL VENTOLIN) 2.5 mg /3 mL (0.083 %) nebu Take 3 mL by inhalation every four (4) hours as needed for Wheezing. 75 mL 1    VENTOLIN HFA 90 mcg/actuation inhaler inhale 2 puffs by mouth every 4 hours if needed for wheezing or shortness of breath 54 g 3    Nebulizer & Compressor machine For use q 6 hours as needed.   Indications: wheezing 1 Each 0    Nebulizer Accessories kit For use q 6 hours as needed. 1 Kit 0    loratadine (CLARITIN) 10 mg tablet Take 10 mg by mouth.  norethindrone-ethinyl estradiol (LOESTRIN FE 1/20, 28,) 1-20 mg-mcg per tablet Take  by mouth.  [place] Thyroid 15 mg tablet Take 15 mg by mouth daily. (Patient not taking: Reported on 4/22/2022)      predniSONE (STERAPRED DS) 10 mg dose pack See administration instruction per 10mg dose pack (Patient not taking: Reported on 11/19/2021) 21 Tab 0    EPINEPHrine (EPIPEN JR) 0.15 mg/0.3 mL (1:2,000) injection 0.15 mg by IntraMUSCular route once as needed.  (Patient not taking: Reported on 11/19/2021)       Review of Systems:  HEENT: No epistaxis, +nasal drainage, no difficulty in swallowing, no redness in eyes  Respiratory: as above  Cardiovascular: no chest pain, no palpitations, no chronic leg edema, no syncope  Gastrointestinal: no abd pain, no vomiting, no diarrhea, no bleeding symptoms  Genitourinary: No urinary symptoms or hematuria  Integument/breast: No ulcers or rashes  Musculoskeletal:Neg  Neurological: No focal weakness, no seizures, no headaches  Behvioral/Psych: No anxiety, no depression  Constitutional: No fever, no chills, no weight loss, no night sweats     Objective:     Visit Vitals  /76 (BP 1 Location: Right upper arm, BP Patient Position: Sitting, BP Cuff Size: Large adult)   Pulse 96   Temp 98 °F (36.7 °C) (Oral)   Resp 18   Ht 5' 2\" (1.575 m)   Wt 75.8 kg (167 lb)   SpO2 98% Comment: RA Rest   BMI 30.54 kg/m²        Physical Exam:   General: comfortable, no acute distress  HEENT: pupils reactive, sclera anicteric, EOM intact  Neck: No adenopathy or thyroid swelling, no lymphadenopathy or JVD, supple  CVS: S1S2 no murmurs  RS: Mod AE bilaterally, no tactile fremitus or egophony, no accessory muscle use  Abd: soft, non tender, no hepatosplenomegaly  Neuro: non focal, awake, alert  Extrm: no leg edema, clubbing or cyanosis  Skin: no rash    Data review:   Pertinent labs: CBC, BMP    IgE: 55  FeNO-29  Allergy testin2019  Positive reactions to mold, feathers, ragweed, weeds, grass, dog  PFT:    Date FVC FEV1  FEV1/FVC [id number] TLC RV RV/TLC VC DLCO   2014  normal  70  decreased  46   increased  increased     2020  83  79  normal  64        2020  85  83  normal  Flow volume loop with plateau of expiratory flow on 2 loops with sawtooth thing      82                      6 min walk test; not indicated      No results found for this or any previous visit. Imaging:  I have personally reviewed the patients radiographs and have reviewed the reports:  XR Results (most recent):  No results found for this or any previous visit. CT Results (most recent):  No results found for this or any previous visit. .     Patient Active Problem List   Diagnosis Code    Moderate persistent asthma J45.40    Allergic rhinitis J30.9     IMPRESSION:   · Asthma-moderate persistent well-controlled combination LABA plus ICS in the form of Breo. Patient has allergic diastasis with documented positive allergen tests and now receiving allergen immunotherapy again. Currently reactive airways appear to be better controlled with combination treatment with Breo, Zyrtec. Will await further response to allergen immunotherapy  · Shortness of breath associated with tachycardia and some palpitations -multifactorial with initial likely side effect of [place] Thyroid and subsequent findings of SVT s/p ablation. Improvement in overall symptoms noted with rate control with metoprolol. She still has episodic events which by description appear to be consistent with either breath-holding /upper airway spasm. Flow volume loop findings of expiratory flow plateau noted -question tracheobronchomalacia related to to COVID infections. Post COVID POTS also likely differential .  Normal D-dimer, echocardiogram makes VTE less likely.   · History of Hashimoto thyroiditis on replacement therapy  · Chronic rhinitis-likely allergic  · History of snoring, fatigue-in combination with flow-volume loop abnormalities -rule out sleep apnea      RECOMMENDATIONS:   · Discussed with patient current condition pathophysiology and need for interventions. · Improvement in overall symptoms is encouraging  · Continue Breo as maintenance therapy for asthma with as needed albuterol  · Continue allergen immunotherapy, alternative for biologics discussed but will hold off since allergen immunotherapy just started  · Continue additional control of triggers-environmental control, treating rhinitis  · Continue close follow-up with cardiology-metoprolol to continue  · Discussed with patient breathing maneuvers that could help during her episodes, add use of incentive spirometer and consider speech therapy interventions should episodes continue  · Sleep study has been ordered  · Questions concerns addressed  · We will follow-up closely-6 weeks to ensure she does not experience further setbacks and intervene early     Health maintenance screens deferred to Primary care provider. Sonja Gates MD    This patient has a high complexity chronic care condition   This Visit needed Moderate/High complexity medically necessary decision making and management plans. Please note that this dictation was completed with [employer], the computer voice recognition software. Quite often unanticipated grammatical, syntax, homophones, and other interpretive errors are inadvertently transcribed by the computer software. Please disregard these errors. Please excuse any errors that have escaped final proofreading.

## 2022-04-22 NOTE — PROGRESS NOTES
James Gordillo presents today for   Chief Complaint   Patient presents with    Results     Labs     Shortness of Breath       Is someone accompanying this pt? No    Is the patient using any DME equipment during OV? No    -DME Company NA    Depression Screening:  3 most recent PHQ Screens 3/31/2020   Little interest or pleasure in doing things Not at all   Feeling down, depressed, irritable, or hopeless Not at all   Total Score PHQ 2 0       Learning Assessment:  Learning Assessment 8/11/2014   PRIMARY LEARNER Patient   HIGHEST LEVEL OF EDUCATION - PRIMARY LEARNER  SOME COLLEGE   BARRIERS PRIMARY LEARNER NONE   CO-LEARNER CAREGIVER No   PRIMARY LANGUAGE ENGLISH   LEARNER PREFERENCE PRIMARY DEMONSTRATION   ANSWERED BY patient   RELATIONSHIP SELF       Abuse Screening:  Abuse Screening Questionnaire 3/31/2020   Do you ever feel afraid of your partner? N   Are you in a relationship with someone who physically or mentally threatens you? N   Is it safe for you to go home? Y       Fall Risk  Fall Risk Assessment, last 12 mths 3/31/2020   Able to walk? Yes   Fall in past 12 months? No         Coordination of Care:  1. Have you been to the ER, urgent care clinic since your last visit? Hospitalized since your last visit? No    2. Have you seen or consulted any other health care providers outside of the [address] [place] [address] since your last visit? Include any pap smears or colon screening.  Dr Kenny Matthews Cardiologist

## 2022-04-22 NOTE — PATIENT INSTRUCTIONS
Controlling Your Asthma: Care Instructions  Your Care Instructions     Asthma is a long-term condition that affects your breathing. It causes the airways that lead to the lungs to swell. During an asthma attack, the airways swell and narrow. This makes it hard to breathe. You may wheeze or cough. If you have a bad attack, you may need emergency care. There are two things to do to treat asthma. · Control asthma over the long term. · Treat attacks when they occur. You and your doctor can make an asthma action plan. It tells you what medicines you need to take every day to control asthma symptoms and what to do if you have an asthma attack. Your asthma action plan can help prevent and treat attacks. When you keep your asthma under control, you can prevent severe attacks and lasting damage to your airways. You need to treat your asthma even when you are not having symptoms. Although asthma is a lifelong disease, treatment can help control it and help you stay healthy. Follow-up care is a key part of your treatment and safety. Be sure to make and go to all appointments, and call your doctor if you are having problems. It's also a good idea to know your test results and keep a list of the medicines you take. How can you care for yourself at home? To control asthma over the long term  Medicines   Controller medicines reduce swelling in your lungs. They also prevent asthma attacks. Take your controller medicine exactly as prescribed. Talk to your doctor if you have any problems with your medicine. · Inhaled corticosteroid is a common and effective controller medicine. Using it the right way can prevent or reduce most side effects. · Take your controller medicine every day, not just when you have symptoms. It helps prevent problems before they occur. · Your doctor may prescribe another medicine that you use along with the corticosteroid. This is often a long-acting bronchodilator.  Do not take this medicine by itself. Using a long-acting bronchodilator by itself can increase your risk of a severe or fatal asthma attack. · Do not take inhaled corticosteroids or long-acting bronchodilators to stop an asthma attack that has already started. They don't work fast enough to help. · Talk to your doctor before you use other medicines. Some medicines, such as aspirin, can cause asthma attacks in some people. Education   · Learn what triggers an asthma attack. Avoid these triggers when you can. Common triggers include colds, smoke, air pollution, dust, pollen, mold, pets, cockroaches, stress, and cold air. · Check yourself for asthma symptoms to know which step to follow in your action plan. Watch for things like being short of breath, having chest tightness, coughing, and wheezing. Also notice if symptoms wake you up at night or if you get tired quickly when you exercise. · If you have a peak flow meter, use it to check how well you are breathing. It can help you know when an asthma attack is going to occur. Then you can take medicine to prevent the asthma attack or make it less severe. · Do not smoke or allow others to smoke around you. Avoid smoky places. Smoking makes asthma worse. If you need help quitting, talk to your doctor about stop-smoking programs and medicines. These can increase your chances of quitting for good. · Avoid colds and the flu. Get a pneumococcal vaccine shot. If you have had one before, ask your doctor whether you need a second dose. Get a flu vaccine every year, as soon as it's available. If you must be around people with colds or the flu, wash your hands often. To treat attacks when they occur  Use your asthma action plan when you have an attack. Your quick-relief medicine will stop an asthma attack. It relaxes the muscles that get tight around the airways. If your doctor prescribed corticosteroid pills to use during an attack, take them as directed.  They may take hours to work, but they may shorten the attack and help you breathe better. · Albuterol is an effective quick-relief inhaler. · Take your quick-relief medicine exactly as prescribed. · Always bring your asthma medicine with you when you travel. · You may need to use quick-relief medicine before you exercise. · Call your doctor if you think you are having a problem with your medicine. When should you call for help? Call 911 anytime you think you may need emergency care. For example, call if:    · You are having severe trouble breathing. Call your doctor now or seek immediate medical care if:    · Your symptoms do not get better after you have followed your asthma action plan.     · You cough up yellow, dark brown, or bloody mucus (sputum). Watch closely for changes in your health, and be sure to contact your doctor if:    · Your coughing and wheezing get worse.     · You need to use your quick-relief medicine on more than 2 days a week within a month (unless it is just for exercise).     · You need help figuring out what is triggering your asthma attacks. Where can you learn more? Go to http://www.gray.com/  Enter X917 in the search box to learn more about \"Controlling Your Asthma: Care Instructions. \"  Current as of: July 6, 2021               Content Version: 13.2  © 2006-2022 [employer]. Care instructions adapted under license by [employer] (which disclaims liability or warranty for this information). If you have questions about a medical condition or this instruction, always ask your healthcare professional. [place] [address] any warranty or liability for your use of this information. Learning About Using an Cobrain  What is an incentive spirometer? An incentive spirometer is a handheld device that exercises your lungs and measures how much air you can breathe in. It tells you and your doctor how well your lungs are working.   The spirometer can help you practice taking deep breaths. Deep breaths can help open your airways and prevent fluid or mucus from building up in your lungs, and make it easier for you to breathe. Using the device can help prevent serious lung infections like pneumonia, improve your breathing after you've had pneumonia or surgery, and keep your airways open and lungs active if you can't get out of bed. How do you use an incentive spirometer? When you use an incentive spirometer, you breathe in air through a tube that is connected to a large air column containing a piston or ball. As you breathe in, the piston or ball inside the column moves up. The height of the piston or ball shows how much air you breathed in. You may feel lightheaded when you breathe in deeply for this exercise. If you feel dizzy or feel like you're going to pass out, stop the exercise and rest.  Each time you do this exercise, keep track of your progress by writing down how high the piston or ball moves up the column. 1. Move the slider on the outside of the large column to the level that you want to reach or that your doctor recommended. 2. Sit or stand up straight, and hold the spirometer in front of you. Be sure to keep it level. 3. To start, breathe out normally. Then close your lips tightly around the mouthpiece. Make sure that you don't block the mouthpiece with your tongue. 4. Take a slow, deep breath. Breathe in as deeply as you can. As you breathe in, the piston or ball inside the large column will move up. 1. Try to move the piston or ball as high up as you can or to the level your doctor recommended. 2. When you can't breathe in anymore, hold your breath for 2 to 5 seconds. 5. Relax, take the mouthpiece out of your mouth, and breathe out normally. 6. Repeat steps 1 through 5 as many times as your doctor tells you to.  7. After you've taken the recommended number of breaths, try to cough a few times.    This will help loosen any mucus that has built up in your lungs. It will make it easier for you to breathe. If you just had surgery on your belly or chest, hold a pillow over your cut (incision) when you cough. Follow-up care is a key part of your treatment and safety. Be sure to make and go to all appointments, and call your doctor if you are having problems. It's also a good idea to know your test results and keep a list of the medicines you take. Where can you learn more? Go to http://www.gray.com/  Enter B979 in the search box to learn more about \"Learning About Using an [employer]. \"  Current as of: July 6, 2021               Content Version: 13.2  © 2006-2022 [employer]. Care instructions adapted under license by [employer] (which disclaims liability or warranty for this information). If you have questions about a medical condition or this instruction, always ask your healthcare professional. [place] [address] any warranty or liability for your use of this information.

## 2022-05-11 ENCOUNTER — TELEPHONE (OUTPATIENT)
Dept: PULMONOLOGY | Age: 27
End: 2022-05-11

## 2022-05-11 DIAGNOSIS — J30.81 ALLERGIC RHINITIS DUE TO ANIMAL HAIR AND DANDER: Primary | ICD-10-CM

## 2022-05-11 DIAGNOSIS — R06.83 SNORING: ICD-10-CM

## 2022-05-11 NOTE — TELEPHONE ENCOUNTER
Tre Lora MD 13 hours ago (6:37 PM)       John Bach,      I wanted to ask if you would be opposed to placing a referral for an ENT? I am desperate to know why I am having such a hard with this shortness of breath. I know its not 24/7 but it is daily and multiple times daily, and a really uncomfortable way to live. [place] read that sometimes an ENT can assess since clearly my chest X-ray & cardio have proven fine based on tests.

## 2022-05-13 ENCOUNTER — HOSPITAL ENCOUNTER (OUTPATIENT)
Dept: SLEEP MEDICINE | Age: 27
Discharge: HOME | End: 2022-05-13
Payer: COMMERCIAL

## 2022-05-13 DIAGNOSIS — R06.83 SNORING: ICD-10-CM

## 2022-05-13 PROCEDURE — 95810 POLYSOM 6/> YRS 4/> PARAM: CPT

## 2022-05-14 VITALS
HEIGHT: 64 IN | WEIGHT: 167.2 LBS | SYSTOLIC BLOOD PRESSURE: 107 MMHG | BODY MASS INDEX: 28.54 KG/M2 | DIASTOLIC BLOOD PRESSURE: 69 MMHG

## 2022-05-30 DIAGNOSIS — G47.33 OSA (OBSTRUCTIVE SLEEP APNEA): Primary | ICD-10-CM

## 2022-05-30 NOTE — PROGRESS NOTES
[address], [address] [facility] [place]  [phone]     Polysomnography Report    Shadi Hernandez       Patient: Krystin Kuo Study Type: Diagnostic PSG   : 1995 Patient Details: Female, 26 years, Height 5' 4\",   MR#: MV   Weight 167.2 lbs, BMI 28.7   Physician: Brandon Martini DO Ref. Physician: Clive Varela MD   Recording Technician: SUSAN Hernandez, [facility] Recording Details: Recorded at 8:35:35 PM on 2022    Scoring Technician:    for 648 minutes. STUDY PROTOCOL: The study consisted of 14 channels, including left and right EOG and EEG, submental and extremity EMG, EKG, airflow, respiratory effort and oximetry measurement. The study report was generated by personal review of the raw data from the patient's sleep study. TECHNICAL: A Hypopnea was scored according to AASM guidelines. A hypopnea was scored when the nasal pressure signal dropped by 30% or greater from the pre-event baseline for greater than or equal to 10 seconds and was accompanied by at least a 3% or 4% drop in the SpO2 from pre-event baseline. For Medicare and/or Medicaid patients, hypopneas were scored with an associated 4% drop in SpO2 from pre-event baseline. PROCEDURE:  Diagnostic Sleep Study      MEDICATIONS: Synthroid, Toprol XL, Breo Ellipta, albuterol, Ventolin, Claritin, Epipen/Epinephrine, Loestrin FE    SUMMARY: The blood pressure readings: PRE SLEEP /83 POST SLEEP /69. The sleep efficiency was 81%. Total Sleep Time: 5.6 hours. The patient spent 74.0 minutes awake after sleep onset. During the night, 21 awakenings were recorded. The arousal index was 26.4 per hour. The sleep onset latency was 7.0 minutes; the stage 2 latency was 12.0 minutes. REM sleep was 13 percent of sleep time; slow wave sleep was 40 percent. Snoring was noted 110.1 percent of the sleep time with a count of 620.      The overall AHI was 11.4 per hour. The RDI was 22.4 per hour. The PLM index was 1.6, with 1 associated with arousal hourly, on average. The time with oxygen saturation below 88% was 1 minutes. The minimum oxygen saturation was 87%. The oxygen desaturation index was 11.2. The average respiratory event lasted 20.4 seconds. The longest event was 114.2 seconds. Mouth breathing was not noted by recording tech. No hypnotic was reportedly taken. The patient reported insufficient sleep the previous night (<7 hours). Sleep hygiene violation:  were not noted by the patient. Sleep was interrupted by one bathroom trip. Cardiac tracing appeared to be normal sinus rhythm, without significant atrial fibrillation, heart block, or bradycardia. Intermittent sinus tachycardia was observed. No alpha intrusion, parasomnias or EEG abnormalities occurred. Supplemental oxygen was not used during this study. Positive Airway Pressure was not used during this study. Summary:  Overall, the patient appeared to tolerate study well, but did report sleeping worse than their typical sleep pattern. The patient slept in a semi-upright position; propped up with 2-3 pillows. Mild KINGSLEY was observed during this study. REM supine sleep was achieved during this study. <U>DIAGNOSIS: 1) Obstructive Sleep Apnea (KINGSLEY)  2) Sinus Tachycardia    RECOMMENDATIONS:     This report was generated by personal review of the raw data which was acceptable for interpretation.  Start patient on auto CPAP (APAP) at 5/15 cwp; EPR/Flex:2.   Other non-invasive treatment options are recommended were applicable and include the following: weight reduction, smoking cessation, body position training, and modification of alcohol ingestion and/or sedating agents.  If these treatments are not possible or effective, an oral appliance may be an alternative and/or adjuvant non-invasive treatment.     If non-invasive treatments are not possible or effective, consideration may be given to possible corrective surgical options.  Healthy sleep habits are encouraged.  Individuals are encouraged to obtain 7-9 hours of sleep per day.   safety is encouraged. Drowsy and/or inattentive driving should be avoided.  COVID-19 precautions as recommended by the Centers for Disease Control (CDC)     Follow up with provider as directed.     Scotty Simon DO Electronically signed at 3:07:16 PM, May 30, 2022

## 2022-06-01 ENCOUNTER — TELEPHONE (OUTPATIENT)
Dept: PULMONOLOGY | Age: 27
End: 2022-06-01

## 2022-06-02 NOTE — PROGRESS NOTES
APAP 5/15 cwp order faxed to [facility], [phone]. Patient contacted and study results reviewed with her. DME info provided to patient and she is encouraged to contact our office with any additional questions or concerns she may have. Patient should hear from MED within 2-3 weeks. If not, I have requested they call the office to let me know. Due to the current situations with the Key recall and COVID pandemic, we are experiencing a back log in CPAP & BiPAP setups. At this time there is a minimum time frame of 6-8 week set up. Patient has been advised of this and has verbalized understanding.

## 2022-06-15 ENCOUNTER — OFFICE VISIT (OUTPATIENT)
Dept: PULMONOLOGY | Age: 27
End: 2022-06-15
Payer: COMMERCIAL

## 2022-06-15 VITALS
BODY MASS INDEX: 28.34 KG/M2 | SYSTOLIC BLOOD PRESSURE: 110 MMHG | HEART RATE: 87 BPM | TEMPERATURE: 97.4 F | WEIGHT: 166 LBS | RESPIRATION RATE: 16 BRPM | HEIGHT: 64 IN | OXYGEN SATURATION: 98 % | DIASTOLIC BLOOD PRESSURE: 72 MMHG

## 2022-06-15 DIAGNOSIS — U09.9 POST-COVID-19 CONDITION: ICD-10-CM

## 2022-06-15 DIAGNOSIS — I47.1 SVT (SUPRAVENTRICULAR TACHYCARDIA) (HCC): ICD-10-CM

## 2022-06-15 DIAGNOSIS — Z99.89 OSA ON CPAP: ICD-10-CM

## 2022-06-15 DIAGNOSIS — G47.33 OSA ON CPAP: ICD-10-CM

## 2022-06-15 DIAGNOSIS — J45.40 MODERATE PERSISTENT ASTHMA WITHOUT COMPLICATION: Primary | ICD-10-CM

## 2022-06-15 PROCEDURE — 99214 OFFICE O/P EST MOD 30 MIN: CPT | Performed by: INTERNAL MEDICINE

## 2022-06-15 NOTE — PATIENT INSTRUCTIONS
Learning About CPAP for Sleep Apnea  What is CPAP? CPAP is a small machine that you use at home every night while you sleep. It increases air pressure in your throat to keep your airway open. When you have sleep apnea, this can help you sleep better, feel better, and avoid future health problems. CPAP stands for \"continuous positive airway pressure. \"  The CPAP machine will have one of the following:  · A mask that covers your nose and mouth  · A mask that covers your nose only  · A nasal pillow that covers only the openings of your nose  Why is it done? CPAP is usually the best treatment for obstructive sleep apnea. It is the first treatment choice and the most widely used. CPAP:  · Helps you have more normal sleep, so you feel less sleepy and more alert during the daytime. · May help keep heart failure or other heart problems from getting worse. · May help lower your blood pressure. If you have a bed partner, they may also sleep better when you use a CPAP. That's because you aren't snoring or restless. Your doctor may suggest CPAP if you have:  · Moderate to severe sleep apnea. · Sleep apnea and coronary artery disease (CAD). · Sleep apnea and heart failure. What are the side effects? Some people who use CPAP have:  · A dry or stuffy nose and a sore throat. · Irritated skin on the face. · Bloating. How can you care for yourself? If using CPAP is not comfortable, or if you have certain side effects, work with your doctor to fix them. Here are some things you can try:  · Be sure the mask, nasal mask, or nasal pillow fits well. · See if your doctor can adjust the pressure of your CPAP. · If your nose or mouth is dry, set the machine to deliver warmer or wetter air. Or try using a humidifier. · If your nose is runny or stuffy, talk to your doctor about using a decongestant medicine or steroid nasal spray. Be safe with medicines. Read and follow all instructions on the label.  Do not use the medicine longer than the label says. · Your doctor may also help you with problems like swallowing air, bloating, or claustrophobia. Talk to your doctor if you're still having problems. If these things don't help, you might try a different type of machine. Where can you learn more? Go to http://www.gray.com/  Enter Kolby Kelley in the search box to learn more about \"Learning About CPAP for Sleep Apnea. \"  Current as of: July 6, 2021               Content Version: 13.2  © 2006-2022 [employer]. Care instructions adapted under license by [employer] (which disclaims liability or warranty for this information). If you have questions about a medical condition or this instruction, always ask your healthcare professional. [address] any warranty or liability for your use of this information.

## 2022-06-15 NOTE — PROGRESS NOTES
Dinesh Bardales presents today for   Chief Complaint   Patient presents with    Results     Sleep Study        Is someone accompanying this pt? No    Is the patient using any DME equipment during OV? No    -DME Company NA    Depression Screening:  3 most recent PHQ Screens 3/31/2020   Little interest or pleasure in doing things Not at all   Feeling down, depressed, irritable, or hopeless Not at all   Total Score PHQ 2 0       Learning Assessment:  Learning Assessment 8/11/2014   PRIMARY LEARNER Patient   HIGHEST LEVEL OF EDUCATION - PRIMARY LEARNER  SOME COLLEGE   BARRIERS PRIMARY LEARNER NONE   CO-LEARNER CAREGIVER No   PRIMARY LANGUAGE ENGLISH   LEARNER PREFERENCE PRIMARY DEMONSTRATION   ANSWERED BY patient   RELATIONSHIP SELF       Abuse Screening:  Abuse Screening Questionnaire 3/31/2020   Do you ever feel afraid of your partner? N   Are you in a relationship with someone who physically or mentally threatens you? N   Is it safe for you to go home? Y       Fall Risk  Fall Risk Assessment, last 12 mths 3/31/2020   Able to walk? Yes   Fall in past 12 months? No         Coordination of Care:  1. Have you been to the ER, urgent care clinic since your last visit? Hospitalized since your last visit? No    2. Have you seen or consulted any other health care providers outside of the [address] [place] [address] since your last visit? Include any pap smears or colon screening.  No

## 2022-06-15 NOTE — PROGRESS NOTES
[facility]  Pulmonary, Critical Care, and Sleep Medicine      Pulmonary Office follow up    Name: Taurus Jurado     : 1995     Date: 6/15/2022        Subjective:   Patient has been referred for evaluation of: Asthma, persistent symptoms of shortness of breath    06/15/22   Patient has completed a sleep study-found to have KINGSLEY with AHI of 11.4 and now prescribed auto CPAP 5 to 15 cm. Patient has as yet not received the device due to backorder. She did see an ENT physician who did not find any ENT problems. She continues to have episodes of shortness of breath and had a CT scan of chest 2022 ordered by her cardiologist Dr. Yosi Miranda significant abnormalities noted to explain. She has follow-up appointment in August  She continues to have significant symptoms of shortness of breath with palpitations and also was diagnosed with COVID-19 in 2022. 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 She underwent ablation procedure on 2/15/2022 with successful slow pathway ablation. Subsequently she was put on beta-blockers  Patient states that she was having symptoms of continued shortness of breath -episodic episodes where she gets a little short of breath-described as breath being cut off and smothering. Ventolin does not help when she gets that and she has noticed that hunching over and taking deep breaths does help. Also her thyroid medications were changed from [place] Thyroid to levothyroxine and since February she is on 50 mcg of Synthroid. Patient has also resumed allergen immunotherapy with her allergist-receiving weekly shots of 3 different immunotherapy  Also had an echocardiogram done on 2022. She brought in reports of the allergy testing, spirometry's and notes from cardiology.   Patient also mentions that she had COVID in 2021 as well  Complains of difficulty sleeping with snoring  Feels fatigued in the daytime     HPI  Patient is a 31 y.o. female has been diagnosed with asthma since early childhood. Over the years patient has needed treatment with inhalers with some seasonal worsening and nocturnal worsening needing increasing rescue medications. She has not had any major hospitalizations related to asthma. In 2019 she had allergy test done-asthma and allergy Center in [facility] Dr. Michael Willingham and was started on allergen immunotherapy. Patient had to stop taking the shots due to Covid in March 2020. Subsequently she lost her job in June 2020 and after she got a new job she did not restart the allergen immunotherapy due to very high insurance co-pay  She states that her asthma has always been fairly well controlled but does have off and on. [place] Blazer works best for her. She uses rescue inhaler more frequently when she has exacerbations. In August 2021 patient noticed increasing symptoms of shortness of breath which have been very bothersome. Patient increased her rescue inhalers and also completed taper of prednisone but did not improve much. In the same time she was diagnosed with hypothyroidism and started on levothyroxine. Further work-up revealed patient with Clemetine Munomayra  Later she noticed that her heart rate was staying consistently high as high as 140. At this point it was thought that the levothyroxine may have something to do with her tachycardia-we will switch back to [place] Thyroid and she is doing better both with her heart rate as well as symptoms of shortness of breath. She had an EKG and an echocardiogram completed as part of her work-up-no additional findings noted. Patient does admit to having chronic nasal symptoms and sinus congestion. She had previously lived in an apartment where she thought there was Naya Neal has now moved out.   She has a cat at home and states that she has not been allergic to cats  She has some exercise intolerance related to shortness of breath and asthma  She has been a never smoker. No history of vaping. No history of using electronic cigarettes  Associated wheezing, chest pain, fever, chills, night sweats dyspepsia, reflux. Patient has been on birth control pill since December 2020  Occupational exposure-none to any inhalation dust, chemicals or fumes. 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 Testing:  CXR  PFT  Echo  IgE level-46  Vaccinations:  Pneumococcal  Influenza    DME:  Nebulizer    Past Medical History:   Diagnosis Date    Moderate persistent asthma     also exercise-induced     History reviewed. No pertinent surgical history. History reviewed. No pertinent family history. No family history of asthma    Allergies   Allergen Reactions    Latex Anaphylaxis and Rash    Banana Anaphylaxis    Shellfish Derived Anaphylaxis   . Current Outpatient Medications   Medication Sig Dispense Refill    levothyroxine (SYNTHROID) 50 mcg tablet Take  by mouth Daily (before breakfast).  metoprolol succinate (TOPROL-XL) 25 mg XL tablet Take 25 mg by mouth daily.  fluticasone furoate-vilanteroL (BREO ELLIPTA) 100-25 mcg/dose inhaler Breo Ellipta 100 mcg-25 mcg/dose powder for inhalation      albuterol (PROVENTIL VENTOLIN) 2.5 mg /3 mL (0.083 %) nebu Take 3 mL by inhalation every four (4) hours as needed for Wheezing.  75 mL 1    VENTOLIN HFA 90 mcg/actuation inhaler inhale 2 puffs by mouth every 4 hours if needed for wheezing or shortness of breath 54 g 3    Nebulizer & Compressor machine For use q 6 hours as needed. Indications: wheezing 1 Each 0    Nebulizer Accessories kit For use q 6 hours as needed. 1 Kit 0    loratadine (CLARITIN) 10 mg tablet Take 10 mg by mouth.  predniSONE (STERAPRED DS) 10 mg dose pack See administration instruction per 10mg dose pack (Patient not taking: Reported on 11/19/2021) 21 Tab 0    EPINEPHrine (EPIPEN JR) 0.15 mg/0.3 mL (1:2,000) injection 0.15 mg by IntraMUSCular route once as needed. (Patient not taking: Reported on 11/19/2021)      norethindrone-ethinyl estradiol (LOESTRIN FE 1/20, 28,) 1-20 mg-mcg per tablet Take  by mouth.  (Patient not taking: Reported on 6/15/2022)       Review of Systems:  HEENT: No epistaxis, +nasal drainage, no difficulty in swallowing, no redness in eyes  Respiratory: as above  Cardiovascular: no chest pain, no palpitations, no chronic leg edema, no syncope  Gastrointestinal: no abd pain, no vomiting, no diarrhea, no bleeding symptoms  Genitourinary: No urinary symptoms or hematuria  Integument/breast: No ulcers or rashes  Musculoskeletal:Neg  Neurological: No focal weakness, no seizures, no headaches  Behvioral/Psych: No anxiety, no depression  Constitutional: No fever, no chills, no weight loss, no night sweats     Objective:     Visit Vitals  /72 (BP 1 Location: Right upper arm, BP Patient Position: Sitting, BP Cuff Size: Large adult)   Pulse 87   Temp 97.4 °F (36.3 °C) (Oral)   Resp 16   Ht 5' 4\" (1.626 m)   Wt 75.3 kg (166 lb)   SpO2 98% Comment: RA Rest   BMI 28.49 kg/m²        Physical Exam:   General: comfortable, no acute distress  HEENT: pupils reactive, sclera anicteric, EOM intact  Neck: No adenopathy or thyroid swelling, no lymphadenopathy or JVD, supple  CVS: S1S2 no murmurs  RS: Mod AE bilaterally, no tactile fremitus or egophony, no accessory muscle use  Abd: soft, non tender, no hepatosplenomegaly  Neuro: non focal, awake, alert  Extrm: no leg edema, clubbing or cyanosis  Skin: no rash    Data review:   Pertinent labs: CBC, BMP    IgE: 46  FeNO-29  Allergy testin2019  Positive reactions to mold, feathers, ragweed, weeds, grass, dog  PFT:    Date FVC FEV1  FEV1/FVC FIA31-61 TLC RV RV/TLC VC DLCO   2014  normal  70  decreased  46   increased  increased     2020  83  79  normal  64        2020  85  83  normal  Flow volume loop with plateau of expiratory flow on 2 loops with sawtooth thing      82                      6 min walk test; not indicated    Sleep study 2022  AHI of 11.3, RDI abnormal consistent with clinical diagnosis of obstructive sleep apnea  Prescribed auto CPAP 5/15 CWP    No results found for this or any previous visit. Imaging:  I have personally reviewed the patients radiographs and have reviewed the reports:  XR Results (most recent):  No results found for this or any previous visit. CT Results (most recent):  No results found for this or any previous visit. .     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 Patient has allergic diastasis with documented positive allergen tests and now receiving allergen immunotherapy again. Currently reactive airways appear to be better controlled with combination treatment with Breo, Zyrtec. Will await further response to allergen immunotherapy  · Shortness of breath associated with tachycardia and some palpitations -multifactorial with initial likely side effect of [place] Thyroid and subsequent findings of SVT s/p ablation. Improvement in overall symptoms noted with rate control with metoprolol.   She still has episodic events which by description appear to be consistent with either breath-holding /upper airway spasm. Flow volume loop findings of expiratory flow plateau noted -question tracheobronchomalacia related to to COVID infections. Post COVID POTS also likely differential .  Normal D-dimer, echocardiogram and recent normal CTA makes VTE less likely. · History of Hashimoto thyroiditis on replacement therapy  · Chronic rhinitis-likely allergic  · KINGSLEY- AHI 11.4      RECOMMENDATIONS:   · Discussed with patient current condition pathophysiology and need for interventions. · Improvement in overall symptoms is encouraging  · Continue Breo as maintenance therapy for asthma with as needed albuterol  · Continue allergen immunotherapy, alternative for biologics discussed but will hold off since allergen immunotherapy just started  · Continue additional control of triggers-environmental control, treating rhinitis  · Continue close follow-up with cardiology-metoprolol to continue. I also requested her to ask her cardiologist about considering cardiac MRI for any structural cardiac abnormalities to explain symptoms  · Once CPAP delivered and treatment initiated we will check 30 days download to see effectiveness and make adjustments  · Discussed with patient breathing maneuvers that could help during her episodes, add use of incentive spirometer and consider speech therapy interventions should episodes continue  · Questions concerns addressed  · We will follow-up closely-2 months to assess effectiveness of CPAP and other interventions     Health maintenance screens deferred to Primary care provider. Kerney Baumgarten, MD    This patient has a high complexity chronic care condition   This Visit needed Moderate/High complexity medically necessary decision making and management plans. Please note that this dictation was completed with [employer], the [employer] voice recognition software.   Quite often unanticipated grammatical, syntax, homophones, and other interpretive errors are inadvertently transcribed by the computer software. Please disregard these errors. Please excuse any errors that have escaped final proofreading.

## 2022-06-15 NOTE — LETTER
6/15/2022    Patient: Rufino Bunn   YOB: 1995   Date of Visit: 6/15/2022     Kaitlin Alberto MD  [address]  [address] [address]  Via Fax: [phone]    Dear Kaitlin Alberto MD,      Thank you for referring Ms. Jonny Hook to [facility] for evaluation. My notes for this consultation are attached. If you have questions, please do not hesitate to call me. I look forward to following your patient along with you.       Sincerely,    Barrett Mcclendon MD

## 2022-07-18 ENCOUNTER — DOCUMENTATION ONLY (OUTPATIENT)
Dept: PULMONOLOGY | Age: 27
End: 2022-07-18

## 2022-09-27 ENCOUNTER — DOCUMENTATION ONLY (OUTPATIENT)
Dept: PULMONOLOGY | Age: 27
End: 2022-09-27

## 2022-09-27 NOTE — PROGRESS NOTES
Left message to set up follow up w/VAP. CPAP set up 9/22/22 w/MED. Pt needs to be at least 30-90 days of using it.

## 2022-10-28 ENCOUNTER — PATIENT MESSAGE (OUTPATIENT)
Dept: PULMONOLOGY | Age: 27
End: 2022-10-28

## 2022-11-04 ENCOUNTER — OFFICE VISIT (OUTPATIENT)
Dept: PULMONOLOGY | Age: 27
End: 2022-11-04
Payer: COMMERCIAL

## 2022-11-04 VITALS
DIASTOLIC BLOOD PRESSURE: 84 MMHG | BODY MASS INDEX: 27.9 KG/M2 | OXYGEN SATURATION: 98 % | SYSTOLIC BLOOD PRESSURE: 128 MMHG | HEART RATE: 96 BPM | RESPIRATION RATE: 18 BRPM | HEIGHT: 64 IN | TEMPERATURE: 97.4 F | WEIGHT: 163.4 LBS

## 2022-11-04 DIAGNOSIS — G47.33 OSA ON CPAP: Primary | ICD-10-CM

## 2022-11-04 DIAGNOSIS — U09.9 POST-COVID-19 CONDITION: ICD-10-CM

## 2022-11-04 DIAGNOSIS — I47.1 SVT (SUPRAVENTRICULAR TACHYCARDIA) (HCC): ICD-10-CM

## 2022-11-04 DIAGNOSIS — Z23 NEEDS FLU SHOT: ICD-10-CM

## 2022-11-04 DIAGNOSIS — Z99.89 OSA ON CPAP: Primary | ICD-10-CM

## 2022-11-04 DIAGNOSIS — J45.40 MODERATE PERSISTENT ASTHMA WITHOUT COMPLICATION: ICD-10-CM

## 2022-11-04 PROCEDURE — 99214 OFFICE O/P EST MOD 30 MIN: CPT | Performed by: INTERNAL MEDICINE

## 2022-11-04 PROCEDURE — 90471 IMMUNIZATION ADMIN: CPT | Performed by: INTERNAL MEDICINE

## 2022-11-04 PROCEDURE — 90686 IIV4 VACC NO PRSV 0.5 ML IM: CPT | Performed by: INTERNAL MEDICINE

## 2022-11-04 RX ORDER — METFORMIN HYDROCHLORIDE 500 MG/1
500 TABLET, EXTENDED RELEASE ORAL 2 TIMES DAILY WITH MEALS
COMMUNITY
Start: 2022-09-04

## 2022-11-04 NOTE — PROGRESS NOTES
Malu Zapien is a 28 y.o. female who presents for routine immunizations. She denies any symptoms , reactions or allergies that would exclude them from being immunized today. Risks and adverse reactions were discussed and the VIS was given to them. All questions were addressed. She was observed for 5 min post injection. There were no reactions observed.     Gudelia Batista

## 2022-11-04 NOTE — PATIENT INSTRUCTIONS
Vaccine Information Statement    Influenza (Flu) Vaccine (Inactivated or Recombinant): What You Need to Know    Many vaccine information statements are available in Amharic and other languages. See www.immunize.org/vis. Hojas de información sobre vacunas están disponibles en español y en muchos otros idiomas. Visite www.immunize.org/vis. 1. Why get vaccinated? Influenza vaccine can prevent influenza (flu). Flu is a contagious disease that spreads around the [place] [facility] every year, usually between October and May. Anyone can get the flu, but it is more dangerous for some people. Infants and young children, people 72 years and older, pregnant people, and people with certain health conditions or a weakened immune system are at greatest risk of flu complications. Pneumonia, bronchitis, sinus infections, and ear infections are examples of flu-related complications. If you have a medical condition, such as heart disease, cancer, or diabetes, flu can make it worse. Flu can cause fever and chills, sore throat, muscle aches, fatigue, cough, headache, and runny or stuffy nose. Some people may have vomiting and diarrhea, though this is more common in children than adults. In an average year, thousands of people in the [facility] die from flu, and many more are hospitalized. Flu vaccine prevents millions of illnesses and flu-related visits to the doctor each year. 2. Influenza vaccines     CDC recommends everyone 6 months and older get vaccinated every flu season. Children 6 months through 6years of age may need 2 doses during a single flu season. Everyone else needs only 1 dose each flu season. It takes about 2 weeks for protection to develop after vaccination. There are many flu viruses, and they are always changing. Each year a new flu vaccine is made to protect against the influenza viruses believed to be likely to cause disease in the upcoming flu season.  Even when the vaccine doesnt exactly match these viruses, it may still provide some protection. Influenza vaccine does not cause flu. Influenza vaccine may be given at the same time as other vaccines. 3. Talk with your health care provider    Tell your vaccination provider if the person getting the vaccine:  Has had an allergic reaction after a previous dose of influenza vaccine, or has any severe, life-threatening allergies   Has ever had Guillain-Barré Syndrome (also called GBS)    In some cases, your health care provider may decide to postpone influenza vaccination until a future visit. Influenza vaccine can be administered at any time during pregnancy. People who are or will be pregnant during influenza season should receive inactivated influenza vaccine. People with minor illnesses, such as a cold, may be vaccinated. People who are moderately or severely ill should usually wait until they recover before getting influenza vaccine. Your health care provider can give you more information. 4. Risks of a vaccine reaction    Soreness, redness, and swelling where the shot is given, fever, muscle aches, and headache can happen after influenza vaccination. There may be a very small increased risk of Guillain-Barré Syndrome (GBS) after inactivated influenza vaccine (the flu shot). Paulie Ashing children who get the flu shot along with pneumococcal vaccine (PCV13) and/or DTaP vaccine at the same time might be slightly more likely to have a seizure caused by fever. Tell your health care provider if a child who is getting flu vaccine has ever had a seizure. People sometimes faint after medical procedures, including vaccination. Tell your provider if you feel dizzy or have vision changes or ringing in the ears. As with any medicine, there is a very remote chance of a vaccine causing a severe allergic reaction, other serious injury, or death. 5. What if there is a serious problem?     An allergic reaction could occur after the vaccinated person leaves the clinic. If you see signs of a severe allergic reaction (hives, swelling of the face and throat, difficulty breathing, a fast heartbeat, dizziness, or weakness), call 9-1-1 and get the person to the nearest hospital.    For other signs that concern you, call your health care provider. Adverse reactions should be reported to the Vaccine Adverse Event Reporting System (VAERS). Your health care provider will usually file this report, or you can do it yourself. Visit the VAERS website at www.vaers. [facility].gov or call 5-976.694.8155. VAERS is only for reporting reactions, and VAERS staff members do not give medical advice. 6. The National Vaccine Injury Compensation Program    The [facility] Vaccine Injury Compensation Program (VICP) is a federal program that was created to compensate people who may have been injured by certain vaccines. Claims regarding alleged injury or death due to vaccination have a time limit for filing, which may be as short as two years. Visit the VICP website at www.[facility].gov/vaccinecompensation or call 1-696.899.9131 to learn about the program and about filing a claim. 7. How can I learn more? Ask your health care provider. Call your local or state health department. Visit the website of the Food and Drug Administration (FDA) for vaccine package inserts and additional information at www.fda.gov/vaccines-blood-biologics/vaccines. Contact the Centers for Disease Control and Prevention (CDC): Call 2-338.821.4073 (1-800-CDC-INFO) or  Visit CDCs influenza website at www.cdc.gov/flu. Vaccine Information Statement   Inactivated Influenza Vaccine   8/6/2021  42 RAND Duncan Estelita [id number]   Department of Health and Human Services  Centers for Disease Control and Prevention    Office Use Only

## 2022-11-04 NOTE — PROGRESS NOTES
Jojo Benson presents today for   Chief Complaint   Patient presents with    Sleep Apnea       Is someone accompanying this pt? No    Is the patient using any DME equipment during OV? No    -[employer] for CPAP     Depression Screening:  3 most recent PHQ Screens 3/31/2020   Little interest or pleasure in doing things Not at all   Feeling down, depressed, irritable, or hopeless Not at all   Total Score PHQ 2 0       Learning Assessment:  Learning Assessment 8/11/2014   PRIMARY LEARNER Patient   HIGHEST LEVEL OF EDUCATION - PRIMARY LEARNER  SOME COLLEGE   BARRIERS PRIMARY LEARNER NONE   CO-LEARNER CAREGIVER No   PRIMARY LANGUAGE ENGLISH   LEARNER PREFERENCE PRIMARY DEMONSTRATION   ANSWERED BY patient   RELATIONSHIP SELF       Abuse Screening:  Abuse Screening Questionnaire 3/31/2020   Do you ever feel afraid of your partner? N   Are you in a relationship with someone who physically or mentally threatens you? N   Is it safe for you to go home? Y       Fall Risk  Fall Risk Assessment, last 12 mths 3/31/2020   Able to walk? Yes   Fall in past 12 months? No         Coordination of Care:  1. Have you been to the ER, urgent care clinic since your last visit? Hospitalized since your last visit   No    2. Have you seen or consulted any other health care providers outside of the [address] [place] [address] since your last visit? Include any pap smears or colon screening.  [facility]  NP Ginny Velazquez

## 2022-11-04 NOTE — LETTER
11/4/2022    Patient: Luis Carter   YOB: 1995   Date of Visit: 11/4/2022     Isra Sanders MD  [address]  [address] [address]  Via Fax: [phone]    Dear Isra Sanders MD,      Thank you for referring Ms. Hipolito Keyes to [facility] [place] for evaluation. My notes for this consultation are attached. If you have questions, please do not hesitate to call me. I look forward to following your patient along with you.       Sincerely,    Yesenia Wheeler MD

## 2022-11-04 NOTE — PROGRESS NOTES
[facility]  Pulmonary, Critical Care, and Sleep Medicine      Pulmonary Office follow up    Name: Malu Zapien     : 1995     Date: 2022        Subjective:   Patient has been referred for evaluation of: Asthma, persistent symptoms of shortness of breath    22   Patient reports significant improvement in overall symptoms since she started the allergy shots. Using her albuterol less frequently. Patient has completed a sleep study-found to have KINGSLEY with AHI of 11.4 and now prescribed auto CPAP 5 to 15 cm. She is here for follow-up-CPAP download from 2022 through 10/20/2022 shows daily usage of average of 6 hours 40 minutes at [id number]/5 with resultant AHI of 0.5    She also completed MRI of heart with some abnormal findings noted and awaiting input from her cardiologist  She continues to have significant symptoms of shortness of breath with palpitations and also was diagnosed with COVID-19 in 2022. 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 She underwent ablation procedure on 2/15/2022 with successful slow pathway ablation. Subsequently she was put on beta-blockers    Also her thyroid medications were changed from [place] Thyroid to levothyroxine and since February she is on 50 mcg of Synthroid. Patient has also resumed allergen immunotherapy with her allergist-receiving weekly shots of 3 different immunotherapy  Also had an echocardiogram done on 2022. She brought in reports of the allergy testing, spirometry's and notes from cardiology. Patient also mentions that she had COVID in 2021 as well  Complains of difficulty sleeping with snoring  Feels fatigued in the daytime     HPI  Patient is a 28 y.o. female has been diagnosed with asthma since early childhood.   Over the years patient has needed treatment with inhalers with some seasonal worsening and nocturnal worsening needing increasing rescue medications. She has not had any major hospitalizations related to asthma. In 2019 she had allergy test done-[facility] and [facility] in [place] Dr. Lito Wilkinson and was started on allergen immunotherapy. Patient had to stop taking the shots due to Covid in March 2020. Subsequently she lost her job in June 2020 and after she got a new job she did not restart the allergen immunotherapy due to very high insurance co-pay  She states that her asthma has always been fairly well controlled but does have off and on. Neha Haynes works best for her. She uses rescue inhaler more frequently when she has exacerbations. In August 2021 patient noticed increasing symptoms of shortness of breath which have been very bothersome. Patient increased her rescue inhalers and also completed taper of prednisone but did not improve much. In the same time she was diagnosed with hypothyroidism and started on levothyroxine. Further work-up revealed patient with Dian Olmedo  Later she noticed that her heart rate was staying consistently high as high as 140. At this point it was thought that the levothyroxine may have something to do with her tachycardia-we will switch back to [place] Thyroid and she is doing better both with her heart rate as well as symptoms of shortness of breath. She had an EKG and an echocardiogram completed as part of her work-up-no additional findings noted. Patient does admit to having chronic nasal symptoms and sinus congestion. She had previously lived in an apartment where she thought there was [place] has now moved out. She has a cat at home and states that she has not been allergic to cats  She has some exercise intolerance related to shortness of breath and asthma  She has been a never smoker. No history of vaping. No history of using electronic cigarettes  Associated wheezing, chest pain, fever, chills, night sweats dyspepsia, reflux.   Patient has been on birth control pill since December 2020  Occupational exposure-none to any inhalation dust, chemicals or fumes. 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 Testing:  CXR  PFT  Echo  IgE level-46  Vaccinations:  Pneumococcal  Influenza    DME:  Nebulizer    Past Medical History:   Diagnosis Date    Moderate persistent asthma     also exercise-induced     History reviewed. No pertinent surgical history. History reviewed. No pertinent family history. No family history of asthma    Allergies   Allergen Reactions    Latex Anaphylaxis and Rash    Banana Anaphylaxis    Shellfish Derived Anaphylaxis   . Current Outpatient Medications   Medication Sig Dispense Refill    metFORMIN ER (GLUCOPHAGE XR) 500 mg tablet Take 500 mg by mouth two (2) times daily (with meals). levothyroxine (SYNTHROID) 50 mcg tablet Take  by mouth Daily (before breakfast). metoprolol succinate (TOPROL-XL) 25 mg XL tablet Take 25 mg by mouth daily. fluticasone furoate-vilanteroL (BREO ELLIPTA) 100-25 mcg/dose inhaler Breo Ellipta 100 mcg-25 mcg/dose powder for inhalation      albuterol (PROVENTIL VENTOLIN) 2.5 mg /3 mL (0.083 %) nebu Take 3 mL by inhalation every four (4) hours as needed for Wheezing. 75 mL 1    VENTOLIN HFA 90 mcg/actuation inhaler inhale 2 puffs by mouth every 4 hours if needed for wheezing or shortness of breath 54 g 3    Nebulizer & Compressor machine For use q 6 hours as needed. Indications: wheezing 1 Each 0    Nebulizer Accessories kit For use q 6 hours as needed. 1 Kit 0    loratadine (CLARITIN) 10 mg tablet Take 10 mg by mouth. EPINEPHrine (EPIPEN JR) 0.15 mg/0.3 mL (1:2,000) injection 0.15 mg by IntraMUSCular route once as needed.  (Patient not taking: No sig reported)       Review of Systems:  HEENT: No epistaxis, +nasal drainage, no difficulty in swallowing, no redness in eyes  Respiratory: as above  Cardiovascular: no chest pain, no palpitations, no chronic leg edema, no syncope  Gastrointestinal: no abd pain, no vomiting, no diarrhea, no bleeding symptoms  Genitourinary: No urinary symptoms or hematuria  Integument/breast: No ulcers or rashes  Musculoskeletal:Neg  Neurological: No focal weakness, no seizures, no headaches  Behvioral/Psych: No anxiety, no depression  Constitutional: No fever, no chills, no weight loss, no night sweats     Objective:     Visit Vitals  /84 (BP 1 Location: Left upper arm, BP Patient Position: Sitting, BP Cuff Size: Large adult)   Pulse 96   Temp 97.4 °F (36.3 °C) (Oral)   Resp 18   Ht 5' 4\" (1.626 m)   Wt 74.1 kg (163 lb 6.4 oz)   SpO2 98% Comment: RA Rest   BMI 28.05 kg/m²        Physical Exam:   General: comfortable, no acute distress  HEENT: pupils reactive, sclera anicteric, EOM intact  Neck: No adenopathy or thyroid swelling, no lymphadenopathy or JVD, supple  CVS: S1S2 no murmurs  RS: Mod AE bilaterally, no tactile fremitus or egophony, no accessory muscle use  Abd: soft, non tender, no hepatosplenomegaly  Neuro: non focal, awake, alert  Extrm: no leg edema, clubbing or cyanosis  Skin: no rash    Data review:   Pertinent labs: CBC, BMP    IgE: 46  FeNO-29  Allergy testin2019  Positive reactions to mold, feathers, ragweed, weeds, grass, dog  PFT:    Date FVC FEV1  FEV1/FVC ZTH83-43 TLC RV RV/TLC VC DLCO   2014  normal  70  decreased  46   increased  increased     2020  83  79  normal  64        2020  85  83 normal  Flow volume loop with plateau of expiratory flow on 2 loops with sawtooth thing      82                      6 min walk test; not indicated    Sleep study 5/13/2022  AHI of 11.3, RDI abnormal consistent with clinical diagnosis of obstructive sleep apnea  Prescribed auto CPAP 5/15 CWP    No results found for this or any previous visit. Imaging:  I have personally reviewed the patients radiographs and have reviewed the reports:  XR Results (most recent):  No results found for this or any previous visit. CT Results (most recent):  No results found for this or any previous visit. .     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 Patient has allergic diastasis with documented positive allergen tests and now receiving allergen immunotherapy again. Currently reactive airways appear to be better controlled with combination treatment with Breo, Zyrtec. Significant improvement in overall controlled with allergen immunotherapy. Now using albuterol infrequently  Shortness of breath associated with tachycardia and some palpitations -multifactorial with initial likely side effect of [place] Thyroid and subsequent findings of SVT s/p ablation. Improvement in overall symptoms noted with rate control with metoprolol. She still has episodic events which by description appear to be consistent with either breath-holding /upper airway spasm. Flow volume loop findings of expiratory flow plateau noted -question tracheobronchomalacia related to to COVID infections. Post COVID POTS also likely differential .  Normal D-dimer, echocardiogram and recent normal CTA makes VTE less likely.   History of Hashimoto thyroiditis on replacement therapy  Chronic rhinitis-likely allergic  Abnormal findings on MRI of the heart-mobile structure in right atrium reported . KINGSLEY- AHI 11.4 on initial sleep study. With current AutoPap down to AHI 0.5      RECOMMENDATIONS:   Discussed with patient CPAP compliance report-effective and tolerating current pressures  Continue Breo as maintenance therapy for asthma with as needed albuterol  Continue allergen immunotherapy  Continue additional control of triggers-environmental control, treating rhinitis  Continue close follow-up with cardiology regarding abnormal MRI findings and further treatment-metoprolol to continue. Discussed with patient breathing maneuvers that could help during her episodes, add use of incentive spirometer and consider speech therapy interventions should episodes continue  Influenza vaccination administered  Questions concerns addressed  We will follow-up closely-6 months to assess effectiveness of CPAP and other interventions     Health maintenance screens deferred to Primary care provider. Bharati Youssef MD    This patient has a high complexity chronic care condition   This Visit needed Moderate/High complexity medically necessary decision making and management plans. Please note that this dictation was completed with [employer], the computer voice recognition software. Quite often unanticipated grammatical, syntax, homophones, and other interpretive errors are inadvertently transcribed by the computer software. Please disregard these errors. Please excuse any errors that have escaped final proofreading.

## 2023-07-06 ENCOUNTER — HOSPITAL ENCOUNTER (OUTPATIENT)
Facility: HOSPITAL | Age: 28
Setting detail: SPECIMEN
Discharge: HOME | End: 2023-07-09
Payer: COMMERCIAL

## 2023-07-06 PROCEDURE — 88175 CYTOPATH C/V AUTO FLUID REDO: CPT
